# Patient Record
Sex: MALE | Race: WHITE | Employment: OTHER | URBAN - METROPOLITAN AREA
[De-identification: names, ages, dates, MRNs, and addresses within clinical notes are randomized per-mention and may not be internally consistent; named-entity substitution may affect disease eponyms.]

---

## 2019-02-06 PROBLEM — M25.512 ACUTE PAIN OF LEFT SHOULDER: Status: ACTIVE | Noted: 2019-02-06

## 2019-02-06 PROBLEM — M25.562 ACUTE PAIN OF BOTH KNEES: Status: ACTIVE | Noted: 2019-02-06

## 2019-02-06 PROBLEM — M25.561 ACUTE PAIN OF BOTH KNEES: Status: ACTIVE | Noted: 2019-02-06

## 2019-07-03 PROBLEM — E11.40 TYPE 2 DIABETES MELLITUS WITH DIABETIC NEUROPATHY (HCC): Status: ACTIVE | Noted: 2019-07-03

## 2019-09-18 RX ORDER — SODIUM CHLORIDE 0.9 % (FLUSH) 0.9 %
5-40 SYRINGE (ML) INJECTION EVERY 8 HOURS
Status: CANCELLED | OUTPATIENT
Start: 2019-09-18

## 2019-09-18 RX ORDER — SODIUM CHLORIDE 0.9 % (FLUSH) 0.9 %
5-40 SYRINGE (ML) INJECTION AS NEEDED
Status: CANCELLED | OUTPATIENT
Start: 2019-09-18

## 2019-09-22 ENCOUNTER — ANESTHESIA EVENT (OUTPATIENT)
Dept: SURGERY | Age: 64
End: 2019-09-22
Payer: COMMERCIAL

## 2019-09-23 ENCOUNTER — HOSPITAL ENCOUNTER (OUTPATIENT)
Age: 64
Setting detail: OUTPATIENT SURGERY
Discharge: HOME | End: 2019-09-23
Attending: ORTHOPAEDIC SURGERY | Admitting: ORTHOPAEDIC SURGERY
Payer: COMMERCIAL

## 2019-09-23 ENCOUNTER — ANESTHESIA (OUTPATIENT)
Dept: SURGERY | Age: 64
End: 2019-09-23
Payer: COMMERCIAL

## 2019-09-23 VITALS
TEMPERATURE: 98 F | WEIGHT: 190 LBS | OXYGEN SATURATION: 95 % | BODY MASS INDEX: 28.89 KG/M2 | RESPIRATION RATE: 15 BRPM | DIASTOLIC BLOOD PRESSURE: 84 MMHG | SYSTOLIC BLOOD PRESSURE: 131 MMHG | HEART RATE: 68 BPM

## 2019-09-23 LAB — GLUCOSE BLD STRIP.AUTO-MCNC: 212 MG/DL (ref 65–100)

## 2019-09-23 PROCEDURE — 77030010509 HC AIRWY LMA MSK TELE -A: Performed by: ANESTHESIOLOGY

## 2019-09-23 PROCEDURE — 77030006788 HC BLD SAW OSC STRY -B: Performed by: ORTHOPAEDIC SURGERY

## 2019-09-23 PROCEDURE — 77030027384 HC PRB ARTHSCP SERFAS STRY -C: Performed by: ORTHOPAEDIC SURGERY

## 2019-09-23 PROCEDURE — 77030040361 HC SLV COMPR DVT MDII -B: Performed by: ORTHOPAEDIC SURGERY

## 2019-09-23 PROCEDURE — A4565 SLINGS: HCPCS | Performed by: ORTHOPAEDIC SURGERY

## 2019-09-23 PROCEDURE — 76010000161 HC OR TIME 1 TO 1.5 HR INTENSV-TIER 1: Performed by: ORTHOPAEDIC SURGERY

## 2019-09-23 PROCEDURE — 74011000250 HC RX REV CODE- 250

## 2019-09-23 PROCEDURE — 76942 ECHO GUIDE FOR BIOPSY: CPT | Performed by: ORTHOPAEDIC SURGERY

## 2019-09-23 PROCEDURE — 76060000033 HC ANESTHESIA 1 TO 1.5 HR: Performed by: ORTHOPAEDIC SURGERY

## 2019-09-23 PROCEDURE — 77030018836 HC SOL IRR NACL ICUM -A: Performed by: ORTHOPAEDIC SURGERY

## 2019-09-23 PROCEDURE — C1713 ANCHOR/SCREW BN/BN,TIS/BN: HCPCS | Performed by: ORTHOPAEDIC SURGERY

## 2019-09-23 PROCEDURE — 82962 GLUCOSE BLOOD TEST: CPT

## 2019-09-23 PROCEDURE — 74011250636 HC RX REV CODE- 250/636: Performed by: ORTHOPAEDIC SURGERY

## 2019-09-23 PROCEDURE — 74011250636 HC RX REV CODE- 250/636: Performed by: ANESTHESIOLOGY

## 2019-09-23 PROCEDURE — 76210000020 HC REC RM PH II FIRST 0.5 HR: Performed by: ORTHOPAEDIC SURGERY

## 2019-09-23 PROCEDURE — 77030006891 HC BLD SHV RESECT STRY -B: Performed by: ORTHOPAEDIC SURGERY

## 2019-09-23 PROCEDURE — 77030004453 HC BUR SHV STRY -B: Performed by: ORTHOPAEDIC SURGERY

## 2019-09-23 PROCEDURE — 76010010054 HC POST OP PAIN BLOCK: Performed by: ORTHOPAEDIC SURGERY

## 2019-09-23 PROCEDURE — 74011250636 HC RX REV CODE- 250/636

## 2019-09-23 PROCEDURE — 77030003602 HC NDL NRV BLK BBMI -B: Performed by: ANESTHESIOLOGY

## 2019-09-23 PROCEDURE — 77030002933 HC SUT MCRYL J&J -A: Performed by: ORTHOPAEDIC SURGERY

## 2019-09-23 PROCEDURE — 76210000063 HC OR PH I REC FIRST 0.5 HR: Performed by: ORTHOPAEDIC SURGERY

## 2019-09-23 PROCEDURE — 77030019605: Performed by: ORTHOPAEDIC SURGERY

## 2019-09-23 PROCEDURE — 77030003666 HC NDL SPINAL BD -A: Performed by: ORTHOPAEDIC SURGERY

## 2019-09-23 RX ORDER — CEFAZOLIN SODIUM/WATER 2 G/20 ML
2 SYRINGE (ML) INTRAVENOUS ONCE
Status: COMPLETED | OUTPATIENT
Start: 2019-09-23 | End: 2019-09-23

## 2019-09-23 RX ORDER — SODIUM CHLORIDE 0.9 % (FLUSH) 0.9 %
5-40 SYRINGE (ML) INJECTION AS NEEDED
Status: DISCONTINUED | OUTPATIENT
Start: 2019-09-23 | End: 2019-09-23 | Stop reason: HOSPADM

## 2019-09-23 RX ORDER — EPINEPHRINE 1 MG/ML
INJECTION INTRAMUSCULAR; INTRAVENOUS; SUBCUTANEOUS AS NEEDED
Status: DISCONTINUED | OUTPATIENT
Start: 2019-09-23 | End: 2019-09-23 | Stop reason: HOSPADM

## 2019-09-23 RX ORDER — MIDAZOLAM HYDROCHLORIDE 1 MG/ML
2 INJECTION, SOLUTION INTRAMUSCULAR; INTRAVENOUS
Status: COMPLETED | OUTPATIENT
Start: 2019-09-23 | End: 2019-09-23

## 2019-09-23 RX ORDER — SODIUM CHLORIDE, SODIUM LACTATE, POTASSIUM CHLORIDE, CALCIUM CHLORIDE 600; 310; 30; 20 MG/100ML; MG/100ML; MG/100ML; MG/100ML
75 INJECTION, SOLUTION INTRAVENOUS CONTINUOUS
Status: DISCONTINUED | OUTPATIENT
Start: 2019-09-23 | End: 2019-09-23 | Stop reason: HOSPADM

## 2019-09-23 RX ORDER — BUPIVACAINE HYDROCHLORIDE 2.5 MG/ML
INJECTION, SOLUTION EPIDURAL; INFILTRATION; INTRACAUDAL
Status: DISCONTINUED | OUTPATIENT
Start: 2019-09-23 | End: 2019-09-23 | Stop reason: HOSPADM

## 2019-09-23 RX ORDER — ONDANSETRON 2 MG/ML
INJECTION INTRAMUSCULAR; INTRAVENOUS AS NEEDED
Status: DISCONTINUED | OUTPATIENT
Start: 2019-09-23 | End: 2019-09-23 | Stop reason: HOSPADM

## 2019-09-23 RX ORDER — PROPOFOL 10 MG/ML
INJECTION, EMULSION INTRAVENOUS AS NEEDED
Status: DISCONTINUED | OUTPATIENT
Start: 2019-09-23 | End: 2019-09-23 | Stop reason: HOSPADM

## 2019-09-23 RX ORDER — SODIUM CHLORIDE 0.9 % (FLUSH) 0.9 %
5-40 SYRINGE (ML) INJECTION EVERY 8 HOURS
Status: DISCONTINUED | OUTPATIENT
Start: 2019-09-23 | End: 2019-09-23 | Stop reason: HOSPADM

## 2019-09-23 RX ORDER — NALOXONE HYDROCHLORIDE 0.4 MG/ML
0.2 INJECTION, SOLUTION INTRAMUSCULAR; INTRAVENOUS; SUBCUTANEOUS AS NEEDED
Status: DISCONTINUED | OUTPATIENT
Start: 2019-09-23 | End: 2019-09-23 | Stop reason: HOSPADM

## 2019-09-23 RX ORDER — HYDROMORPHONE HYDROCHLORIDE 2 MG/ML
0.5 INJECTION, SOLUTION INTRAMUSCULAR; INTRAVENOUS; SUBCUTANEOUS
Status: DISCONTINUED | OUTPATIENT
Start: 2019-09-23 | End: 2019-09-23 | Stop reason: HOSPADM

## 2019-09-23 RX ORDER — DEXAMETHASONE SODIUM PHOSPHATE 4 MG/ML
INJECTION, SOLUTION INTRA-ARTICULAR; INTRALESIONAL; INTRAMUSCULAR; INTRAVENOUS; SOFT TISSUE AS NEEDED
Status: DISCONTINUED | OUTPATIENT
Start: 2019-09-23 | End: 2019-09-23 | Stop reason: HOSPADM

## 2019-09-23 RX ORDER — LIDOCAINE HYDROCHLORIDE 10 MG/ML
0.1 INJECTION INFILTRATION; PERINEURAL AS NEEDED
Status: DISCONTINUED | OUTPATIENT
Start: 2019-09-23 | End: 2019-09-23 | Stop reason: HOSPADM

## 2019-09-23 RX ORDER — LIDOCAINE HYDROCHLORIDE 20 MG/ML
INJECTION, SOLUTION EPIDURAL; INFILTRATION; INTRACAUDAL; PERINEURAL AS NEEDED
Status: DISCONTINUED | OUTPATIENT
Start: 2019-09-23 | End: 2019-09-23 | Stop reason: HOSPADM

## 2019-09-23 RX ORDER — BUPIVACAINE HYDROCHLORIDE AND EPINEPHRINE 5; 5 MG/ML; UG/ML
INJECTION, SOLUTION EPIDURAL; INTRACAUDAL; PERINEURAL
Status: DISCONTINUED | OUTPATIENT
Start: 2019-09-23 | End: 2019-09-23 | Stop reason: HOSPADM

## 2019-09-23 RX ORDER — OXYCODONE AND ACETAMINOPHEN 5; 325 MG/1; MG/1
1 TABLET ORAL AS NEEDED
Status: DISCONTINUED | OUTPATIENT
Start: 2019-09-23 | End: 2019-09-23 | Stop reason: HOSPADM

## 2019-09-23 RX ADMIN — SODIUM CHLORIDE, SODIUM LACTATE, POTASSIUM CHLORIDE, AND CALCIUM CHLORIDE: 600; 310; 30; 20 INJECTION, SOLUTION INTRAVENOUS at 07:38

## 2019-09-23 RX ADMIN — BUPIVACAINE HYDROCHLORIDE AND EPINEPHRINE 10 ML: 5; 5 INJECTION, SOLUTION EPIDURAL; INTRACAUDAL; PERINEURAL at 07:22

## 2019-09-23 RX ADMIN — MIDAZOLAM HYDROCHLORIDE 2 MG: 2 INJECTION, SOLUTION INTRAMUSCULAR; INTRAVENOUS at 07:16

## 2019-09-23 RX ADMIN — BUPIVACAINE HYDROCHLORIDE 10 ML: 2.5 INJECTION, SOLUTION EPIDURAL; INFILTRATION; INTRACAUDAL at 07:22

## 2019-09-23 RX ADMIN — PROPOFOL 200 MG: 10 INJECTION, EMULSION INTRAVENOUS at 07:45

## 2019-09-23 RX ADMIN — DEXAMETHASONE SODIUM PHOSPHATE 4 MG: 4 INJECTION, SOLUTION INTRA-ARTICULAR; INTRALESIONAL; INTRAMUSCULAR; INTRAVENOUS; SOFT TISSUE at 08:05

## 2019-09-23 RX ADMIN — Medication 2 G: at 08:00

## 2019-09-23 RX ADMIN — LIDOCAINE HYDROCHLORIDE 80 MG: 20 INJECTION, SOLUTION EPIDURAL; INFILTRATION; INTRACAUDAL; PERINEURAL at 07:45

## 2019-09-23 RX ADMIN — SODIUM CHLORIDE, SODIUM LACTATE, POTASSIUM CHLORIDE, AND CALCIUM CHLORIDE 75 ML/HR: 600; 310; 30; 20 INJECTION, SOLUTION INTRAVENOUS at 06:55

## 2019-09-23 RX ADMIN — ONDANSETRON 4 MG: 2 INJECTION INTRAMUSCULAR; INTRAVENOUS at 08:05

## 2019-09-23 NOTE — DISCHARGE INSTRUCTIONS
ACTIVITY  · As tolerated and as directed by your doctor. · Bathe or shower as directed by your doctor. DIET  · Clear liquids until no nausea or vomiting; then light diet for the first day. · Advance to regular diet on second day, unless your doctor orders otherwise. · If nausea and vomiting continues, call your doctor. PAIN  · Take pain medication as directed by your doctor. · Call your doctor if pain is NOT relieved by medication. · DO NOT take aspirin of blood thinners unless directed by your doctor. DRESSING CARE       CALL YOUR DOCTOR IF   · Excessive bleeding that does not stop after holding pressure over the area  · Temperature of 101 degrees F or above  · Excessive redness, swelling or bruising, and/ or green or yellow, smelly discharge from incision    AFTER ANESTHESIA   · For the first 24 hours: DO NOT Drive, Drink alcoholic beverages, or Make important decisions. · Be aware of dizziness following anesthesia and while taking pain medication. APPOINTMENT DATE/ TIME    YOUR DOCTOR'S PHONE NUMBER       DISCHARGE SUMMARY from Nurse    PATIENT INSTRUCTIONS:    After general anesthesia or intravenous sedation, for 24 hours or while taking prescription Narcotics:  · Limit your activities  · Do not drive and operate hazardous machinery  · Do not make important personal or business decisions  · Do  not drink alcoholic beverages  · If you have not urinated within 8 hours after discharge, please contact your surgeon on call. *  Please give a list of your current medications to your Primary Care Provider. *  Please update this list whenever your medications are discontinued, doses are      changed, or new medications (including over-the-counter products) are added. *  Please carry medication information at all times in case of emergency situations.       These are general instructions for a healthy lifestyle:    No smoking/ No tobacco products/ Avoid exposure to second hand smoke    Surgeon General's Warning:  Quitting smoking now greatly reduces serious risk to your health. Obesity, smoking, and sedentary lifestyle greatly increases your risk for illness    A healthy diet, regular physical exercise & weight monitoring are important for maintaining a healthy lifestyle    You may be retaining fluid if you have a history of heart failure or if you experience any of the following symptoms:  Weight gain of 3 pounds or more overnight or 5 pounds in a week, increased swelling in our hands or feet or shortness of breath while lying flat in bed. Please call your doctor as soon as you notice any of these symptoms; do not wait until your next office visit. Recognize signs and symptoms of STROKE:    F-face looks uneven    A-arms unable to move or move unevenly    S-speech slurred or non-existent    T-time-call 911 as soon as signs and symptoms begin-DO NOT go       Back to bed or wait to see if you get better-TIME IS BRAIN. Post-Operative Instructions   For  Shoulder Arthroscopy  Phone:  [phone]    1. Apply ice to the shoulder as needed. 2. You may shower after the arthroscopy. Keep dressings in place for the first three days and do not get them wet. After three days, you may remove the dressings and leave the steri-strip bandages in place; they will peel off naturally. 3. You may discontinue use of your sling and begin active range of motion of the elbow as tolerated by pain, unless you are instructed otherwise. Do not lift arm by your side for 4 weeks. 4. Begin therapy as ordered. 5. Use any pain medication as instructed. You should take your pain medication as soon as you feel the anesthetic wearing off. Do not wait until you are in severe pain to begin taking your pain medication. 6. You may have some side effects from your pain medication. If you have nausea, try taking your medication with food.   For itching, you may take over the counter Benadryl. 7. You may have been given a prescription for Phenergan. This medication is used for nausea and vomiting. You do not need to get this prescription filled unless you have a problem. 8. If you have a problem, please call [address] [place] [address] at [phone], P.A. Post-Operative Instructions   For  Shoulder Arthroscopy  Phone:  [phone]    1. Apply ice to the shoulder as needed. 2. You may shower after the arthroscopy. Keep dressings in place for the first three days and do not get them wet. After three days, you may remove the dressings and leave the steri-strip bandages in place; they will peel off naturally. 3. You may discontinue use of your sling and begin active range of motion of the elbow as tolerated by pain, unless you are instructed otherwise. Do not lift arm by your side for 4 weeks. 4. Begin therapy as ordered. 5. Use any pain medication as instructed. You should take your pain medication as soon as you feel the anesthetic wearing off. Do not wait until you are in severe pain to begin taking your pain medication. 6. You may have some side effects from your pain medication. If you have nausea, try taking your medication with food. For itching, you may take over the counter Benadryl. 7. You may have been given a prescription for Phenergan. This medication is used for nausea and vomiting. You do not need to get this prescription filled unless you have a problem. 8. If you have a problem, please call [address] [place] [address] at [phone], P.A. Post-Operative Instructions   For  Shoulder Arthroscopy  Phone:  [phone]    1. Apply ice to the shoulder as needed. 2. You may shower after the arthroscopy. Keep dressings in place for the first three days and do not get them wet.   After three days, you may remove the dressings and leave the steri-strip bandages in place; they will peel off naturally. 3. You may discontinue use of your sling and begin active range of motion of the elbow as tolerated by pain, unless you are instructed otherwise. Do not lift arm by your side for 4 weeks. 4. Begin therapy as ordered. 5. Use any pain medication as instructed. You should take your pain medication as soon as you feel the anesthetic wearing off. Do not wait until you are in severe pain to begin taking your pain medication. 6. You may have some side effects from your pain medication. If you have nausea, try taking your medication with food. For itching, you may take over the counter Benadryl. 7. You may have been given a prescription for Phenergan. This medication is used for nausea and vomiting. You do not need to get this prescription filled unless you have a problem. 8. If you have a problem, please call [address] [place] [address] at [phone], P.A.

## 2019-09-23 NOTE — OP NOTES
[address] [facility]  OPERATIVE REPORT    Name:  Hollie Garrett  MR#:  [id number]  :  1955  ACCOUNT #:  [de-identified]  DATE OF SERVICE:  2019    PREOPERATIVE DIAGNOSES:  Impingement of the left shoulder and acromioclavicular joint arthritis. POSTOPERATIVE DIAGNOSES:  1.  Mild chondromalacia of the glenohumeral joint. 2.  Acromioclavicular joint arthritis. 3.  Small bursal tear of the rotator cuff. 4.  Impingement of the left shoulder. PROCEDURE PERFORMED:  1. Arthroscopic Robert resection of the distal clavicle of the left shoulder. 43666  2. Chondroplasty of the glenohumeral joint. 79500  3. Debridement of small bursal tear of the rotator cuff. 4.  Subacromial decompression. 81231    SURGEON:  Talya Houston MD    ASSISTANT:  None. ANESTHESIA:  General.    COMPLICATIONS:  None. SPECIMENS REMOVED:  None. IMPLANTS:  None. ESTIMATED BLOOD LOSS:  Minimal.    PROCEDURE:  After an adequate level of general anesthesia was obtained, the left shoulder was prepped and draped in the usual sterile fashion. He had full range of motion of the shoulder. He had a block per Anesthesia for postop pain management and received antibiotics. The joint was distended posteriorly with saline and the arthroscope introduced. The patient was noted to have some mild chondromalacia in the joint. It was not significant. A chondroplasty was performed. There was no eburnated bone. This was performed through the anterior portal.  The biceps was intact. It was probed and very stable. Arthroscope was placed in the subacromial space. The patient had some hooking of the acromion and a decompression was performed with the shaver and the raquel. He had some osteophytes anteriorly and laterally. The rotator cuff was well visualized and the rotator cuff was intact. There was some bursal tearing which was debrided but overall it was stable and there was no need for repair.   The arthroscope was then placed in the subacromial space and a lateral portal was made. Circumferentially, soft tissue was removed around the end of the distal clavicle and then the inferior osteophytes removed from the lateral portal removing 1 cm inferiorly, and then through the anterior portal with pressure applied superiorly, the soft tissue was removed. A raquel was used to remove the superior bone removing 1 cm. The wound was then copiously irrigated. Sterile dressings were then applied. The arm was placed in a sling. He tolerated the procedure well.       MD ALISHA [id number]  D:  09/23/2019 8:56  T:  09/23/2019 9:28  JOB #:  [id number]  CC:  [id number] [facility]

## 2019-09-23 NOTE — H&P
Outpatient Surgery History and Physical:  Deandre Leos was seen and examined. CHIEF COMPLAINT:    l shoulder . PE:     Visit Vitals  /77 (BP 1 Location: Right arm, BP Patient Position: Supine)   Pulse 75   Temp 97.8 °F (36.6 °C)   Resp 18   Wt 86.2 kg (190 lb)   SpO2 95%   BMI 28.89 kg/m²       Heart:   Regular rhythm      Lungs:  Are clear      Past Medical History:    Patient Active Problem List    Diagnosis    Type 2 diabetes mellitus with diabetic neuropathy (HCC)    Type II diabetes mellitus with complication, uncontrolled (HCC)    Acute pain of left shoulder    Acute pain of both knees       Surgical History:   Past Surgical History:   Procedure Laterality Date    HX ORTHOPAEDIC      LT wrist    HX ORTHOPAEDIC      knee    HX ORTHOPAEDIC      RT lower herina       Social History: Patient  reports that he has quit smoking. He has never used smokeless tobacco. He reports that he does not drink alcohol or use drugs. Family History:   Family History   Problem Relation Age of Onset   Niecy Yates Arthritis-osteo Mother     Diabetes Mother     Diabetes Father     No Known Problems Sister     No Known Problems Brother        Allergies: Reviewed per EMR  No Known Allergies    Medications:    No current facility-administered medications on file prior to encounter. Current Outpatient Medications on File Prior to Encounter   Medication Sig    metFORMIN (GLUCOPHAGE) 500 mg tablet Take 1 Tab by mouth two (2) times daily (with meals).  atorvastatin (LIPITOR) 10 mg tablet Take 1 Tab by mouth daily. For diabetes treatment.  suvorexant (BELSOMRA) 20 mg tablet Take 1 Tab by mouth nightly as needed for Insomnia. Max Daily Amount: 20 mg.    gabapentin (NEURONTIN) 100 mg capsule Take 1 Cap by mouth three (3) times daily. (Patient taking differently: Take 100 mg by mouth three (3) times daily.  Per anesthesia protocol:instructed to take am of surgery.)    diclofenac (VOLTAREN) 1 % gel Apply  to affected area four (4) times daily.  miscellaneous medical supply misc Glucose test meter -use for up to three times daily glucose testing. ICD-10 E11.8    miscellaneous medical supply misc Lancets for up to three times daily glucose testing, icd-10 E11.8    miscellaneous medical supply misc Glucose Test Strips -Use for three times daily glucose testing - Icd-10 E 11.8    naproxen sodium (ALEVE) 220 mg tablet Take 220 mg by mouth two (2) times daily (with meals). Per anesthesia protocol: pt instructed to stop med 5 days prior to day of surgery. The surgery is planned for the . History and physical has been reviewed. The patient has been examined. There have been no significant clinical changes since the completion of the originally dated History and Physical.  Patient identified by surgeon; surgical site was confirmed by patient and surgeon. The patient is here today for outpatient surgery. I have examined the patient, no changes are noted in the patient's medical status. Necessity for the procedure/care is still present and the history and physical above is current. See the office notes for the full long term history of the problem. Please see the recent office notes for the musculoskeletal examination.     Signed By: Neida Mcghee MD     September 23, 2019 6:51 AM

## 2019-09-23 NOTE — ANESTHESIA PREPROCEDURE EVALUATION
Relevant Problems   No relevant active problems       Anesthetic History   No history of anesthetic complications            Review of Systems / Medical History  Patient summary reviewed, nursing notes reviewed and pertinent labs reviewed    Pulmonary  Within defined limits                 Neuro/Psych   Within defined limits           Cardiovascular                  Exercise tolerance: >4 METS     GI/Hepatic/Renal                Endo/Other    Diabetes: well controlled, type 2    Arthritis     Other Findings   Comments: type 2. normal fasting (180-220).  Jardiance Rx 9/18/19 in addition to metfromin           Physical Exam    Airway  Mallampati: II  TM Distance: < 4 cm  Neck ROM: normal range of motion   Mouth opening: Normal     Cardiovascular    Rhythm: regular           Dental  No notable dental hx       Pulmonary  Breath sounds clear to auscultation               Abdominal  Abdominal exam normal       Other Findings            Anesthetic Plan    ASA: 2  Anesthesia type: general      Post-op pain plan if not by surgeon: peripheral nerve block single      Anesthetic plan and risks discussed with: Patient

## 2019-09-23 NOTE — ANESTHESIA POSTPROCEDURE EVALUATION
Procedure(s):  LEFT SHOULDER ARTHROSCOPY  / DISTAL CLAVICLE RESECTION / CHONDROPLASTY.     general    Anesthesia Post Evaluation      Multimodal analgesia: multimodal analgesia used between 6 hours prior to anesthesia start to PACU discharge  Patient location during evaluation: PACU  Patient participation: complete - patient participated  Level of consciousness: awake and alert  Pain management: adequate  Airway patency: patent  Anesthetic complications: no  Cardiovascular status: acceptable  Respiratory status: acceptable  Hydration status: acceptable  Post anesthesia nausea and vomiting:  none      Vitals Value Taken Time   /86 9/23/2019  9:19 AM   Temp     Pulse 67 9/23/2019  9:19 AM   Resp 16 9/23/2019  9:19 AM   SpO2 98 % 9/23/2019  9:19 AM

## 2019-09-23 NOTE — BRIEF OP NOTE
BRIEF OPERATIVE NOTE    Date of Procedure: 9/23/2019   Preoperative Diagnosis: Degenerative joint disease of left acromioclavicular joint [M19.012]  Strain of muscle(s) and tendon(s) of the rotator cuff of left shoulder, initial encounter [S46.012A]  Degenerative tear of glenoid labrum of left shoulder [M24.112]  Postoperative Diagnosis: Degenerative joint disease of left acromioclavicular joint [M19.012]  CHONDROMALACIA    Procedure(s):  LEFT SHOULDER ARTHROSCOPY  / DISTAL CLAVICLE RESECTION / CHONDROPLASTY  Surgeon(s) and Role:      Alessandro Huang MD - Primary         Surgical Assistant:           Surgical Staff:  Circ-1: Kia Kim  Circ-2: Alejandro Brown RN  Scrub Tech-1: Anthony Bynum Time In Time Out   Incision Start 8147    Incision Close 9112      Anesthesia: General   Estimated Blood Loss:     Specimens: * No specimens in log *   Findings:      Complications:     Implants: * No implants in log *

## 2019-09-23 NOTE — ANESTHESIA PROCEDURE NOTES
Left Interscalene Nerve Block    Start time: 9/23/2019 7:16 AM  End time: 9/23/2019 7:22 AM  Performed by: Sonny Lares MD  Authorized by: Sonny Lares MD       Pre-procedure: Indications: at surgeon's request and post-op pain management    Preanesthetic Checklist: patient identified, risks and benefits discussed, site marked, timeout performed, anesthesia consent given and patient being monitored    Timeout Time: 07:16          Block Type:   Block Type: Interscalene  Laterality:  Left  Monitoring:  Standard ASA monitoring, responsive to questions, oxygen, continuous pulse ox, frequent vital sign checks and heart rate  Injection Technique:  Single shot  Procedures: ultrasound guided    Patient Position: seated  Prep: chlorhexidine    Location:  Interscalene  Needle Type:  Stimuplex  Needle Gauge:  22 G  Needle Localization:  Ultrasound guidance    Assessment:  Number of attempts:  1  Injection Assessment:  Incremental injection every 5 mL, negative aspiration for CSF, no paresthesia, ultrasound image on chart, no intravascular symptoms, negative aspiration for blood and local visualized surrounding nerve on ultrasound  Patient tolerance:  Patient tolerated the procedure well with no immediate complications  The relevant block area was scanned before, during, and after the local anesthetic injection and no gross abnormalities were observed.

## 2021-07-14 PROBLEM — N52.9 ERECTILE DYSFUNCTION: Status: ACTIVE | Noted: 2021-07-14

## 2021-07-14 PROBLEM — Z12.11 ENCOUNTER FOR SCREENING COLONOSCOPY: Status: ACTIVE | Noted: 2021-07-14

## 2021-08-13 PROBLEM — Z12.11 ENCOUNTER FOR SCREENING COLONOSCOPY: Status: RESOLVED | Noted: 2021-07-14 | Resolved: 2021-08-13

## 2021-10-28 PROBLEM — Z12.11 ENCOUNTER FOR SCREENING COLONOSCOPY: Status: RESOLVED | Noted: 2021-07-14 | Resolved: 2021-10-28

## 2022-01-17 ENCOUNTER — HOSPITAL ENCOUNTER (OUTPATIENT)
Dept: LAB | Age: 67
Discharge: HOME | End: 2022-01-17
Attending: SURGERY

## 2022-01-17 ENCOUNTER — HOSPITAL ENCOUNTER (EMERGENCY)
Age: 67
Discharge: STILL ADMITTED OR ADMINISTRATIVE | End: 2022-01-17
Attending: EMERGENCY MEDICINE

## 2022-01-19 ENCOUNTER — ANESTHESIA (OUTPATIENT)
Dept: ENDOSCOPY | Age: 67
End: 2022-01-19
Payer: MEDICARE

## 2022-01-19 ENCOUNTER — ANESTHESIA EVENT (OUTPATIENT)
Dept: ENDOSCOPY | Age: 67
End: 2022-01-19
Payer: MEDICARE

## 2022-01-19 ENCOUNTER — HOSPITAL ENCOUNTER (OUTPATIENT)
Age: 67
Setting detail: OUTPATIENT SURGERY
Discharge: HOME | End: 2022-01-19
Attending: SURGERY | Admitting: SURGERY
Payer: MEDICARE

## 2022-01-19 VITALS
BODY MASS INDEX: 25.92 KG/M2 | RESPIRATION RATE: 14 BRPM | DIASTOLIC BLOOD PRESSURE: 77 MMHG | OXYGEN SATURATION: 98 % | WEIGHT: 175 LBS | HEART RATE: 59 BPM | SYSTOLIC BLOOD PRESSURE: 130 MMHG | TEMPERATURE: 97.7 F | HEIGHT: 69 IN

## 2022-01-19 DIAGNOSIS — Z12.11 ENCOUNTER FOR SCREENING COLONOSCOPY: ICD-10-CM

## 2022-01-19 LAB
GLUCOSE BLD STRIP.AUTO-MCNC: 240 MG/DL (ref 65–100)
SERVICE CMNT-IMP: ABNORMAL

## 2022-01-19 PROCEDURE — 74011250636 HC RX REV CODE- 250/636: Performed by: REGISTERED NURSE

## 2022-01-19 PROCEDURE — 74011250636 HC RX REV CODE- 250/636: Performed by: ANESTHESIOLOGY

## 2022-01-19 PROCEDURE — G0121 COLON CA SCRN NOT HI RSK IND: HCPCS | Performed by: SURGERY

## 2022-01-19 PROCEDURE — 76040000025: Performed by: SURGERY

## 2022-01-19 PROCEDURE — 74011000250 HC RX REV CODE- 250: Performed by: REGISTERED NURSE

## 2022-01-19 PROCEDURE — 76060000031 HC ANESTHESIA FIRST 0.5 HR: Performed by: SURGERY

## 2022-01-19 PROCEDURE — 82962 GLUCOSE BLOOD TEST: CPT

## 2022-01-19 PROCEDURE — 2709999900 HC NON-CHARGEABLE SUPPLY: Performed by: SURGERY

## 2022-01-19 RX ORDER — PROPOFOL 10 MG/ML
INJECTION, EMULSION INTRAVENOUS AS NEEDED
Status: DISCONTINUED | OUTPATIENT
Start: 2022-01-19 | End: 2022-01-19 | Stop reason: HOSPADM

## 2022-01-19 RX ORDER — SODIUM CHLORIDE, SODIUM LACTATE, POTASSIUM CHLORIDE, CALCIUM CHLORIDE 600; 310; 30; 20 MG/100ML; MG/100ML; MG/100ML; MG/100ML
1000 INJECTION, SOLUTION INTRAVENOUS CONTINUOUS
Status: DISCONTINUED | OUTPATIENT
Start: 2022-01-19 | End: 2022-01-19 | Stop reason: HOSPADM

## 2022-01-19 RX ORDER — LIDOCAINE HYDROCHLORIDE 20 MG/ML
INJECTION, SOLUTION EPIDURAL; INFILTRATION; INTRACAUDAL; PERINEURAL AS NEEDED
Status: DISCONTINUED | OUTPATIENT
Start: 2022-01-19 | End: 2022-01-19 | Stop reason: HOSPADM

## 2022-01-19 RX ORDER — PROPOFOL 10 MG/ML
INJECTION, EMULSION INTRAVENOUS
Status: DISCONTINUED | OUTPATIENT
Start: 2022-01-19 | End: 2022-01-19 | Stop reason: HOSPADM

## 2022-01-19 RX ADMIN — PROPOFOL 140 MCG/KG/MIN: 10 INJECTION, EMULSION INTRAVENOUS at 10:52

## 2022-01-19 RX ADMIN — PROPOFOL 50 MG: 10 INJECTION, EMULSION INTRAVENOUS at 10:52

## 2022-01-19 RX ADMIN — SODIUM CHLORIDE, SODIUM LACTATE, POTASSIUM CHLORIDE, AND CALCIUM CHLORIDE: 600; 310; 30; 20 INJECTION, SOLUTION INTRAVENOUS at 10:14

## 2022-01-19 RX ADMIN — PROPOFOL 20 MG: 10 INJECTION, EMULSION INTRAVENOUS at 10:53

## 2022-01-19 RX ADMIN — LIDOCAINE HYDROCHLORIDE 50 MG: 20 INJECTION, SOLUTION EPIDURAL; INFILTRATION; INTRACAUDAL; PERINEURAL at 10:52

## 2022-01-19 NOTE — OP NOTES
[address] [facility]  OPERATIVE REPORT    Name:  Marcos Busch  MR#:  [id number]  :  1955  ACCOUNT #:  [de-identified]  DATE OF SERVICE:  2022    PREOPERATIVE DIAGNOSIS:  Screening colonoscopy. POSTOPERATIVE DIAGNOSIS:  Diverticula of the cecum and ascending colon. PROCEDURE PERFORMED:  Colonoscopy. SURGEON:  Linh Walden MD    ASSISTANT:  none    ANESTHESIA:  MAC.    COMPLICATIONS:  None. SPECIMENS REMOVED:  None. IMPLANTS:  None. ESTIMATED BLOOD LOSS:  None. COUNT:  Correct. PROCEDURE:  After the patient was brought into the room, time-out was carried out and all were in agreement. He was rolled onto his left side and MAC anesthesia introduced. Once he was ready, the scope gently inserted into the rectum and under direct visualization passed to the level of the cecum. The patient had a poor prep. Slow withdrawal was done with findings of cecal diverticula and ascending colon diverticula. Transverse colon, splenic flexure, descending colon and sigmoid normal.  Rectum normal.  Anus normal.  Scope removed. As much air that could be evacuated was done prior to removal of the scope. The patient tolerated the procedure well.       MD JACK [email or web address]  D:  2022 11:09  T:  2022 12:36  JOB #:  [id number]

## 2022-01-19 NOTE — ANESTHESIA POSTPROCEDURE EVALUATION
Procedure(s):  COLONOSCOPY/ BMI 27. total IV anesthesia    Anesthesia Post Evaluation        Patient location during evaluation: PACU  Patient participation: complete - patient participated  Level of consciousness: awake and alert  Pain management: adequate  Airway patency: patent  Anesthetic complications: no  Cardiovascular status: acceptable  Respiratory status: acceptable  Hydration status: acceptable  Post anesthesia nausea and vomiting:  none  Final Post Anesthesia Temperature Assessment:  Normothermia (36.0-37.5 degrees C)      INITIAL Post-op Vital signs:   Vitals Value Taken Time   /77 01/19/22 1139   Temp     Pulse 59 01/19/22 1139   Resp     SpO2 98 % 01/19/22 1139   Vitals shown include unvalidated device data.

## 2022-01-19 NOTE — ANESTHESIA PREPROCEDURE EVALUATION
Relevant Problems   ENDOCRINE   (+) Type 2 diabetes mellitus with diabetic neuropathy (HCC)   (+) Type II diabetes mellitus with complication, uncontrolled (HCC)       Anesthetic History   No history of anesthetic complications            Review of Systems / Medical History  Patient summary reviewed, nursing notes reviewed and pertinent labs reviewed    Pulmonary  Within defined limits                 Neuro/Psych   Within defined limits           Cardiovascular              Hyperlipidemia    Exercise tolerance: >4 METS     GI/Hepatic/Renal                Endo/Other    Diabetes: well controlled, type 2    Arthritis     Other Findings   Comments: type 2. normal fasting (180-220)           Physical Exam    Airway  Mallampati: II  TM Distance: < 4 cm  Neck ROM: normal range of motion   Mouth opening: Normal     Cardiovascular    Rhythm: regular           Dental  No notable dental hx       Pulmonary  Breath sounds clear to auscultation               Abdominal  Abdominal exam normal       Other Findings            Anesthetic Plan    ASA: 3  Anesthesia type: total IV anesthesia            Anesthetic plan and risks discussed with: Patient

## 2022-01-19 NOTE — BRIEF OP NOTE
Brief Postoperative Note    Patient: Olga Sidhu  YOB: 1955  MRN: [id number]    Date of Procedure: 1/19/2022     Pre-Op Diagnosis: Colon cancer screening [Z12.11]    Post-Op Diagnosis: Cecal and Ascending colon diverticulae      Procedure(s):  COLONOSCOPY/ BMI 27    Surgeon(s):  Gill Munoz MD    Surgical Assistant: None    Anesthesia: MAC     Estimated Blood Loss (mL): Minimal    Complications: None    Specimens: * No specimens in log *     Implants: * No implants in log *    Drains: * No LDAs found *    Findings: Poor prep    Electronically Signed by Joseph Landaverde MD on 1/19/2022 at 11:09 AM

## 2022-01-19 NOTE — H&P
RANDY Mccormick is a 72 y.o. male Patient presents today for screening colonoscopy. Patient denies melena, denies hematochezia, denies abdominal or rectal pain. Patient states bowel habits are good, denies diarrhea or constipation. Good appetite, no unintentional weight loss noted. Denies N/V. Denies CP or SOB. He has never had one.     Does not  have a family history of colon cancer. Does not take blood thinners                Past Medical History:   Diagnosis Date    Arrhythmia       pt denies history    Degenerative joint disease of left acromioclavicular joint      Diabetes ([address]       type 2. normal fasting (180-220). Jardiance Rx 9/18/19 in addition to metfromin by PCP    Strain of muscle(s) and tendon(s) of the rotator cuff of left shoulder, initial encounter               Current Outpatient Medications   Medication Sig Dispense Refill    atorvastatin (LIPITOR) 10 mg tablet Take 1 Tablet by mouth daily. 90 Tablet 3    sildenafil citrate (Viagra) 100 mg tablet Take 1 Tablet by mouth as needed for Erectile Dysfunction. 30 Tablet 5    glimepiride (AMARYL) 4 mg tablet Take 1 Tablet by mouth every morning. 90 Tablet 1    losartan (COZAAR) 25 mg tablet Take 1 Tablet by mouth daily. Stop lisinopril , ACE I cough 90 Tablet 1    gabapentin (NEURONTIN) 100 mg capsule Take 1 Capsule by mouth three (3) times daily. 270 Capsule 1    miscellaneous medical supply misc Diabetic shoes, one pair, peripheral neuropathy. ICD 10 E11.9 1 Each 0    metFORMIN (GLUCOPHAGE) 500 mg tablet Three tablets in am and two tablets in pm po  CHANGE OF DOSE 450 Tablet 1    miscellaneous medical supply misc Glucose Test Strips -Use for three times daily glucose testing - Icd-10 E 11.8 300 Each 3    miscellaneous medical supply [facility] Lancets for up to three times daily glucose testing, icd-10 E11.8 300 Each 3    miscellaneous medical supply misc Glucose test meter -use for up to three times daily glucose testing.   ICD-10 E11.8 1 Each 0    naproxen sodium (ALEVE) 220 mg tablet Take 220 mg by mouth two (2) times daily (with meals). Per anesthesia protocol: pt instructed to stop med 5 days prior to day of surgery.        diclofenac (VOLTAREN) 1 % gel Apply 4 g to affected area four (4) times daily. 300 g 3      No Known Allergies        Past Surgical History:   Procedure Laterality Date    HX ORTHOPAEDIC         LT wrist    HX ORTHOPAEDIC         knee    HX ORTHOPAEDIC         RT lower herina            Family History   Problem Relation Age of Onset    Arthritis-osteo Mother      Diabetes Mother      Diabetes Father      No Known Problems Sister      No Known Problems Brother        Social History           Tobacco Use    Smoking status: Former Smoker    Smokeless tobacco: Never Used   Substance Use Topics    Alcohol use: No         Review of Systems   A comprehensive review of systems was negative except for that written in the HPI. Physical Exam  Visit Vitals  BP (!) 140/80   Pulse 78   Ht 5' 8.5\" (1.74 m)   Wt 180 lb 11.2 oz (82 kg)   SpO2 96%   BMI 27.08 kg/m²         Constitutional: Alert, oriented, cooperative patient in no acute distress; appears stated age    Eyes:Sclera are clear. EOMs intact  ENMT: no external lesions gross hearing normal; no obvious neck masses, no ear or lip lesions, nares normal  CV: RRR. Normal perfusion  Resp: No JVD. Breathing is  non-labored; no audible wheezing. GI: soft and non-distended     Musculoskeletal: unremarkable with normal function. No embolic signs or cyanosis. Neuro:  Oriented; moves all 4; no focal deficits  Psychiatric: normal affect and mood, no memory impairment        Labs:      Assessment/Plan:   Mateusz Mariscal is a 72 y.o. male who has signs and symptoms consistent with need for screening colonoscopy. Patient verbalized understanding of importance for bowel prep.      1. Schedule screening colonoscopy. He would like to go forward.   Proceed with screening colonoscopy. The patient was counseled at length about the risks of edmnud Covid-19 during their perioperative period and any recovery window from their procedure.  The patient was made aware that edmund Covid-19  may worsen their prognosis for recovering from their procedure and lend to a higher morbidity and/or mortality risk.  All material risks, benefits, and reasonable alternatives including postponing the procedure were discussed. The patient does  wish to proceed with the procedure at this time.              I discussed the patient's condition and treatment options with the patient. I discussed risks of colonoscopy in language the patient could understand including bleeding, infection, aspiration, perforation, medication reaction, need for further endoscopy or surgery, abscess, fistula, SBO, DVT, PE, heart attack, stroke, renal failure, respiratory failure, ventilatory dependence, and death. The patient voiced understanding of all this and all questions were answered. Alternatives to colonoscopy were discussed also and risks of the alternatives. The patient requested that we proceed with colonoscopy. Informed consent was obtained. A copy of this note is sent to the referring physician.       Electronically signed by Rakesh Burns MD

## 2022-03-19 PROBLEM — M25.561 ACUTE PAIN OF BOTH KNEES: Status: ACTIVE | Noted: 2019-02-06

## 2022-03-19 PROBLEM — E11.40 TYPE 2 DIABETES MELLITUS WITH DIABETIC NEUROPATHY (HCC): Status: ACTIVE | Noted: 2019-07-03

## 2022-03-19 PROBLEM — N52.9 ERECTILE DYSFUNCTION: Status: ACTIVE | Noted: 2021-07-14

## 2022-03-19 PROBLEM — M25.512 ACUTE PAIN OF LEFT SHOULDER: Status: ACTIVE | Noted: 2019-02-06

## 2022-03-19 PROBLEM — M25.562 ACUTE PAIN OF BOTH KNEES: Status: ACTIVE | Noted: 2019-02-06

## 2022-10-17 ENCOUNTER — OFFICE VISIT (OUTPATIENT)
Dept: PRIMARY CARE CLINIC | Facility: CLINIC | Age: 67
End: 2022-10-17
Payer: MEDICARE

## 2022-10-17 VITALS
SYSTOLIC BLOOD PRESSURE: 141 MMHG | BODY MASS INDEX: 26.37 KG/M2 | TEMPERATURE: 97 F | DIASTOLIC BLOOD PRESSURE: 80 MMHG | WEIGHT: 176 LBS | HEART RATE: 81 BPM

## 2022-10-17 DIAGNOSIS — N40.0 BPH WITHOUT URINARY OBSTRUCTION: ICD-10-CM

## 2022-10-17 DIAGNOSIS — N48.1 BALANITIS: ICD-10-CM

## 2022-10-17 DIAGNOSIS — E55.9 VITAMIN D DEFICIENCY: ICD-10-CM

## 2022-10-17 DIAGNOSIS — Z23 FLU VACCINE NEED: ICD-10-CM

## 2022-10-17 DIAGNOSIS — I10 PRIMARY HYPERTENSION: ICD-10-CM

## 2022-10-17 DIAGNOSIS — Z00.00 MEDICARE ANNUAL WELLNESS VISIT, SUBSEQUENT: Primary | ICD-10-CM

## 2022-10-17 DIAGNOSIS — E78.2 MIXED HYPERLIPIDEMIA: ICD-10-CM

## 2022-10-17 DIAGNOSIS — B37.49 YEAST DERMATITIS OF PENIS: ICD-10-CM

## 2022-10-17 DIAGNOSIS — E11.40 TYPE 2 DIABETES MELLITUS WITH DIABETIC NEUROPATHY, WITHOUT LONG-TERM CURRENT USE OF INSULIN (HCC): ICD-10-CM

## 2022-10-17 PROCEDURE — 1123F ACP DISCUSS/DSCN MKR DOCD: CPT | Performed by: FAMILY MEDICINE

## 2022-10-17 PROCEDURE — 90694 VACC AIIV4 NO PRSRV 0.5ML IM: CPT | Performed by: FAMILY MEDICINE

## 2022-10-17 PROCEDURE — G0008 ADMIN INFLUENZA VIRUS VAC: HCPCS | Performed by: FAMILY MEDICINE

## 2022-10-17 PROCEDURE — G0439 PPPS, SUBSEQ VISIT: HCPCS | Performed by: FAMILY MEDICINE

## 2022-10-17 PROCEDURE — 99214 OFFICE O/P EST MOD 30 MIN: CPT | Performed by: FAMILY MEDICINE

## 2022-10-17 RX ORDER — FLUCONAZOLE 100 MG/1
100 TABLET ORAL DAILY
Qty: 7 TABLET | Refills: 1 | Status: SHIPPED | OUTPATIENT
Start: 2022-10-17 | End: 2022-10-24

## 2022-10-17 RX ORDER — GLIMEPIRIDE 4 MG/1
4 TABLET ORAL
Qty: 90 TABLET | Refills: 1 | Status: SHIPPED | OUTPATIENT
Start: 2022-10-17

## 2022-10-17 RX ORDER — SILDENAFIL 100 MG/1
100 TABLET, FILM COATED ORAL PRN
Qty: 30 TABLET | Status: CANCELLED | OUTPATIENT
Start: 2022-10-17

## 2022-10-17 RX ORDER — ATORVASTATIN CALCIUM 10 MG/1
10 TABLET, FILM COATED ORAL DAILY
Qty: 3090 TABLET | Refills: 1 | Status: SHIPPED | OUTPATIENT
Start: 2022-10-17

## 2022-10-17 RX ORDER — LOSARTAN POTASSIUM 25 MG/1
TABLET ORAL
Qty: 30 TABLET | Status: CANCELLED | OUTPATIENT
Start: 2022-10-17

## 2022-10-17 RX ORDER — NYSTATIN 100000 U/G
CREAM TOPICAL
Qty: 30 G | Refills: 1 | Status: SHIPPED | OUTPATIENT
Start: 2022-10-17

## 2022-10-17 RX ORDER — GABAPENTIN 100 MG/1
100 CAPSULE ORAL 3 TIMES DAILY
Qty: 90 CAPSULE | Status: CANCELLED | OUTPATIENT
Start: 2022-10-17

## 2022-10-17 RX ORDER — LOSARTAN POTASSIUM 50 MG/1
50 TABLET ORAL DAILY
Qty: 90 TABLET | Refills: 1 | Status: SHIPPED | OUTPATIENT
Start: 2022-10-17

## 2022-10-17 RX ORDER — GABAPENTIN 300 MG/1
300 CAPSULE ORAL 3 TIMES DAILY
Qty: 270 CAPSULE | Refills: 1 | Status: SHIPPED | OUTPATIENT
Start: 2022-10-17 | End: 2022-11-16

## 2022-10-17 ASSESSMENT — PATIENT HEALTH QUESTIONNAIRE - PHQ9
SUM OF ALL RESPONSES TO PHQ9 QUESTIONS 1 & 2: 0
SUM OF ALL RESPONSES TO PHQ QUESTIONS 1-9: 0
2. FEELING DOWN, DEPRESSED OR HOPELESS: 0
SUM OF ALL RESPONSES TO PHQ QUESTIONS 1-9: 0
1. LITTLE INTEREST OR PLEASURE IN DOING THINGS: 0

## 2022-10-17 ASSESSMENT — VISUAL ACUITY
OD_CC: 20/30
OS_CC: 20/30

## 2022-10-17 ASSESSMENT — LIFESTYLE VARIABLES
HOW MANY STANDARD DRINKS CONTAINING ALCOHOL DO YOU HAVE ON A TYPICAL DAY: PATIENT DOES NOT DRINK
HOW OFTEN DO YOU HAVE A DRINK CONTAINING ALCOHOL: NEVER

## 2022-10-17 NOTE — PROGRESS NOTES
Vanessa Bautista MD  [address] [place]  Ph No:  [phone]  Fax:  [phone]:  Chief Complaint   Patient presents with    Penis Pain     Pt is in because he has a yeast infection under the skin of the head of his penis. Abnormal Lab     Pt wants his A1c today. Also he need all other labs. Microalbumin    Medicare AWV          HISTORY OF PRESENT ILLNESS:  Mr. Elba Escalona is a 77 y.o. male. Pt presents for diabetic recheck and general follow up visit. Pt says he feels okay, as to glucose; it is not perfectly controlled, but averages between 170 and 200 or greater. Pt is currently only taking oral med and advised we need to obtain labs today to be certain about his [id number], as this will help us to know how to treat him more effectively. Pt is advised we will consider Trulicity and/or possibly a long acting insulin for better control. If Trulicity is used, we may consider stopping the amaryl as a medication. Pt will let us know when his new insurance is in place and we will try for additional medication after first of year. /80, recheck next visit to see if cozaar (ARB) needs increased of dose. Hyperlipidemia - pt is on lipitor, statin. Diabetic foot exam:      Right foot, loss of sensation, great toe and 2,3,4, plantar pad but lower is okay. Left foot, loss of sensation plantar fat pad, toes okay, base of great toe, pre-ulcerative callous. Pt does qualify for diabetic shoes, will need to ask him if he wants a pair of shoes. Pt reports his primary concern today is balanitis. Pt reports his foreskin will not retract for about a month and it is becoming a discomfort to him. Pt is advised we will start him on diflucan, but since he cannot retract the foreskin we will refer him to the urologist for further evaluation and treatment. Pt is referred.     Following lab review, recommendations will be made as to better diabetes control and/or other issues of concern as may be indicated. HISTORY:  No Known Allergies  Past Medical History:   Diagnosis Date    Arrhythmia     pt denies history    Degenerative joint disease of left acromioclavicular joint     Diabetes ([facility] [address]     type 2. normal fasting (180-220).  Jardiance Rx 9/18/19 in addition to metfromin by PCP    Strain of muscle(s) and tendon(s) of the rotator cuff of left shoulder, initial encounter      Past Surgical History:   Procedure Laterality Date    COLONOSCOPY N/A 1/19/2022    COLONOSCOPY/ BMI 27 performed by Antonio Cummings MD at [facility] ENDOSCOPY    ORTHOPEDIC SURGERY      LT wrist    ORTHOPEDIC SURGERY      RT lower herina    ORTHOPEDIC SURGERY      knee     Family History   Problem Relation Age of Onset    Osteoarthritis Mother     No Known Problems Brother     No Known Problems Sister     Diabetes Father     Diabetes Mother      Social History     Socioeconomic History    Marital status:      Spouse name: Not on file    Number of children: Not on file    Years of education: Not on file    Highest education level: Not on file   Occupational History    Not on file   Tobacco Use    Smoking status: Former    Smokeless tobacco: Never   Substance and Sexual Activity    Alcohol use: No    Drug use: No    Sexual activity: Not on file   Other Topics Concern    Not on file   Social History Narrative    Not on file     Social Determinants of Health     Financial Resource Strain: Not on file   Food Insecurity: Not on file   Transportation Needs: Not on file   Physical Activity: Sufficiently Active    Days of Exercise per Week: 7 days    Minutes of Exercise per Session: 40 min   Stress: Not on file   Social Connections: Not on file   Intimate Partner Violence: Not on file   Housing Stability: Not on file     Current Outpatient Medications   Medication Sig Dispense Refill    metFORMIN (GLUCOPHAGE) 500 MG tablet Three tablets in am and two tablets in pm po CHANGE OF DOSE 450 tablet 1    glimepiride (AMARYL) 4 MG tablet Take 1 tablet by mouth every morning (before breakfast) TAKE 1 TABLET BY MOUTH EVERY DAY IN THE MORNING 90 tablet 1    atorvastatin (LIPITOR) 10 MG tablet Take 1 tablet by mouth daily 3090 tablet 1    fluconazole (DIFLUCAN) 100 MG tablet Take 1 tablet by mouth daily for 7 days 7 tablet 1    nystatin (MYCOSTATIN) 004161 UNIT/GM cream Apply topically 2 times daily. 30 g 1    gabapentin (NEURONTIN) 300 MG capsule Take 1 capsule by mouth 3 times daily for 30 days. 270 capsule 1    losartan (COZAAR) 50 MG tablet Take 1 tablet by mouth daily 90 tablet 1    diclofenac sodium (VOLTAREN) 1 % GEL Apply 4 g topically 4 times daily      gabapentin (NEURONTIN) 100 MG capsule Take 100 mg by mouth 3 times daily. losartan (COZAAR) 25 MG tablet TAKE 1 TABLET BY MOUTH DAILY      naproxen sodium (ANAPROX) 220 MG tablet Take 220 mg by mouth 2 times daily (with meals)      sildenafil (VIAGRA) 100 MG tablet Take 100 mg by mouth as needed       No current facility-administered medications for this visit. REVIEW OF SYSTEMS:  Review of systems is as indicated in HPI otherwise negative. PHYSICAL EXAM:  Vital Signs - BP (!) 141/80 (Site: Right Upper Arm)   Pulse 81   Temp 97 °F (36.1 °C) (Temporal)   Wt 176 lb (79.8 kg)   BMI 26.37 kg/m²      Physical Exam  Vitals and nursing note reviewed. Constitutional:       General: He is in acute distress. Appearance: Normal appearance. He is normal weight. Comments: See HPI, not well controlled diabetes, neuropathy, presents today with ballanitis   HENT:      Head: Normocephalic and atraumatic. Nose: Nose normal.      Mouth/Throat:      Mouth: Mucous membranes are moist.      Pharynx: Oropharynx is clear. Eyes:      Extraocular Movements: Extraocular movements intact. Conjunctiva/sclera: Conjunctivae normal.      Pupils: Pupils are equal, round, and reactive to light.    Cardiovascular:      Rate and Rhythm: Normal rate and regular rhythm. Pulses: Normal pulses. Heart sounds: Normal heart sounds. Pulmonary:      Effort: Pulmonary effort is normal.      Comments: Coarse breath sounds, but otherwise mostly clear to ascultation bilaterally  Abdominal:      General: Abdomen is flat. Bowel sounds are normal.      Palpations: Abdomen is soft. Genitourinary:     Comments: Balanitis - unable to retract foreskin X 1 month  Musculoskeletal:         General: Normal range of motion. Cervical back: Normal range of motion and neck supple. Skin:     General: Skin is warm and dry. Capillary Refill: Capillary refill takes 2 to 3 seconds. Comments: But see above   Neurological:      General: No focal deficit present. Mental Status: He is alert and oriented to person, place, and time. Sensory: Sensory deficit present. Comments: See diabetic foot exam   Psychiatric:         Behavior: Behavior normal.         Thought Content: Thought content normal.         Judgment: Judgment normal.      Comments: General health anxiety           LABS  No results found for this visit on 10/17/22. IMPRESSION/PLAN     Diagnosis Orders   1. Medicare annual wellness visit, subsequent        2. Flu vaccine need  Influenza, FLUAD, (age 72 y+), IM, Preservative Free, 0.5 mL      3. Yeast dermatitis of penis  fluconazole (DIFLUCAN) 100 MG tablet    nystatin (MYCOSTATIN) 704561 UNIT/GM cream    [address] [place]      4.  Type 2 diabetes mellitus with diabetic neuropathy, without long-term current use of insulin (HCC)  metFORMIN (GLUCOPHAGE) 500 MG tablet    glimepiride (AMARYL) 4 MG tablet    atorvastatin (LIPITOR) 10 MG tablet     DIABETES FOOT EXAM    gabapentin (NEURONTIN) 300 MG capsule    losartan (COZAAR) 50 MG tablet    CBC with Auto Differential    Comprehensive Metabolic Panel    Hemoglobin A1C    Vitamin D 25 Hydroxy    TSH    T4, Free    Lipid Panel    Microalbumin / Creatinine Urine Ratio      5. BPH without urinary obstruction  PSA, Diagnostic      6. Vitamin D deficiency  Vitamin D 25 Hydroxy      7. Balanitis        8. Primary hypertension  losartan (COZAAR) 50 MG tablet    Comprehensive Metabolic Panel      9. Mixed hyperlipidemia  atorvastatin (LIPITOR) 10 MG tablet          No follow-up provider specified. Rosanne Young MD            Dictated using voice recognition software. Proofread, but unrecognized voice recognition errors may exist.      Medicare Annual Wellness Visit    Maryan Scales is here for Penis Pain (Pt is in because he has a yeast infection under the skin of the head of his penis.), Abnormal Lab (Pt wants his A1c today. Also he need all other labs. Microalbumin), and Medicare AWV    Assessment & Plan   Medicare annual wellness visit, subsequent  Flu vaccine need  -     Influenza, FLUAD, (age 72 y+), IM, Preservative Free, 0.5 mL  Yeast dermatitis of penis  -     fluconazole (DIFLUCAN) 100 MG tablet; Take 1 tablet by mouth daily for 7 days, Disp-7 tablet, R-1Normal  -     nystatin (MYCOSTATIN) 829734 UNIT/GM cream; Apply topically 2 times daily. , Disp-30 g, R-1, Normal  -     [address] [place]  Type 2 diabetes mellitus with diabetic neuropathy, without long-term current use of insulin (HCC)  -     metFORMIN (GLUCOPHAGE) 500 MG tablet; Three tablets in am and two tablets in pm po CHANGE OF DOSE, Disp-450 tablet, R-1Normal  -     glimepiride (AMARYL) 4 MG tablet; Take 1 tablet by mouth every morning (before breakfast) TAKE 1 TABLET BY MOUTH EVERY DAY IN THE MORNING, Disp-90 tablet, R-1Normal  -     atorvastatin (LIPITOR) 10 MG tablet; Take 1 tablet by mouth daily, Disp-3090 tablet, R-1Normal  -     [facility] DIABETES FOOT EXAM  -     gabapentin (NEURONTIN) 300 MG capsule; Take 1 capsule by mouth 3 times daily for 30 days. , Disp-270 capsule, R-1Normal  -     losartan (COZAAR) 50 MG tablet;  Take 1 tablet by mouth daily, Disp-90 tablet, R-1Normal  -     CBC with Auto Differential; Future  -     Comprehensive Metabolic Panel; Future  -     Hemoglobin A1C; Future  -     Vitamin D 25 Hydroxy; Future  -     TSH; Future  -     T4, Free; Future  -     Lipid Panel; Future  -     Microalbumin / Creatinine Urine Ratio; Future  BPH without urinary obstruction  -     PSA, Diagnostic; Future  Vitamin D deficiency  -     Vitamin D 25 Hydroxy; Future  Balanitis  Primary hypertension  -     losartan (COZAAR) 50 MG tablet; Take 1 tablet by mouth daily, Disp-90 tablet, R-1Normal  -     Comprehensive Metabolic Panel; Future  Mixed hyperlipidemia  -     atorvastatin (LIPITOR) 10 MG tablet; Take 1 tablet by mouth daily, Disp-3090 tablet, R-1Normal    Recommendations for Preventive Services Due: see orders and patient instructions/AVS.  Recommended screening schedule for the next 5-10 years is provided to the patient in written form: see Patient Instructions/AVS.     Return in 3 months (on 1/17/2023), or hgba1c, cmp, for Medicare Annual Wellness Visit in 1 year, for labs one week before appt. Subjective   The following acute and/or chronic problems were also addressed today:  See HPI above, multiple medical concerns. Patient's complete Health Risk Assessment and screening values have been reviewed and are found in Flowsheets. The following problems were reviewed today and where indicated follow up appointments were made and/or referrals ordered.     Positive Risk Factor Screenings with Interventions:             General Health and ACP:  General  In general, how would you say your health is?: Very Good  In the past 7 days, have you experienced any of the following: New or Increased Pain, New or Increased Fatigue, Loneliness, Social Isolation, Stress or Anger?: No  Do you get the social and emotional support that you need?: Yes  Do you have a Living Will?: (!) No    Advance Directives       Power of  Living Will ACP-Advance Directive ACP-Power of [employer] Not on File Not on File Not on File Not on File          General Health Risk Interventions:  Diabetic foot exam and review of potential medication changes    Health Habits/Nutrition:  Physical Activity: Sufficiently Active    Days of Exercise per Week: 7 days    Minutes of Exercise per Session: 40 min     Have you lost any weight without trying in the past 3 months?: No     Have you seen the dentist within the past year?: (!) No  Health Habits/Nutrition Interventions:  None, but pt to continue diabetic diet    Hearing/Vision:  Do you or your family notice any trouble with your hearing that hasn't been managed with hearing aids?: No  Do you have difficulty driving, watching TV, or doing any of your daily activities because of your eyesight?: No  Have you had an eye exam within the past year?: (!) No  Vision Screening    Right eye Left eye Both eyes   Without correction      With correction 20/30 20/30 20/30     Hearing/Vision Interventions:  Diabetic eye exam, to be done annually    Safety:  Do you have working smoke detectors?: Yes  Do you have any tripping hazards - loose or unsecured carpets or rugs?: No  Do you have any tripping hazards - clutter in doorways, halls, or stairs?: No  Do you have either shower bars, grab bars, non-slip mats or non-slip surfaces in your shower or bathtub?: Yes  Do all of your stairways have a railing or banister?: (!) No  Do you always fasten your seatbelt when you are in a car?: Yes  Safety Interventions:  Patient declines any further evaluation/treatment for this issue           Objective   Vitals:    10/17/22 1124 10/17/22 1126   BP: (!) 144/80 (!) 141/80   Site: Left Upper Arm Right Upper Arm   Position: Sitting    Cuff Size: Large Adult    Pulse: 81    Temp: 97 °F (36.1 °C)    TempSrc: Temporal    Weight: 176 lb (79.8 kg)       Body mass index is 26.37 kg/m². No Known Allergies  Prior to Visit Medications    Medication Sig Taking?  Authorizing Provider   metFORMIN (GLUCOPHAGE) 500 MG tablet Three tablets in am and two tablets in pm po CHANGE OF DOSE Yes Valentine Roland MD   glimepiride (AMARYL) 4 MG tablet Take 1 tablet by mouth every morning (before breakfast) TAKE 1 TABLET BY MOUTH EVERY DAY IN THE MORNING Yes Valentine Roland MD   atorvastatin (LIPITOR) 10 MG tablet Take 1 tablet by mouth daily Yes Valentine Roland MD   fluconazole (DIFLUCAN) 100 MG tablet Take 1 tablet by mouth daily for 7 days Yes Valentine Roland MD   nystatin (MYCOSTATIN) 840341 UNIT/GM cream Apply topically 2 times daily. Yes Valentine Roland MD   gabapentin (NEURONTIN) 300 MG capsule Take 1 capsule by mouth 3 times daily for 30 days. Yes Valentine Beckman., MD   losartan (COZAAR) 50 MG tablet Take 1 tablet by mouth daily Yes Valentine Roland MD   diclofenac sodium (VOLTAREN) 1 % GEL Apply 4 g topically 4 times daily Yes Ar Automatic Reconciliation   gabapentin (NEURONTIN) 100 MG capsule Take 100 mg by mouth 3 times daily.  Yes Ar Automatic Reconciliation   losartan (COZAAR) 25 MG tablet TAKE 1 TABLET BY MOUTH DAILY Yes Ar Automatic Reconciliation   naproxen sodium (ANAPROX) 220 MG tablet Take 220 mg by mouth 2 times daily (with meals) Yes Ar Automatic Reconciliation   sildenafil (VIAGRA) 100 MG tablet Take 100 mg by mouth as needed Yes Ar Automatic Reconciliation       CareTeam (Including outside providers/suppliers regularly involved in providing care):   Patient Care Team:  Valentine Roland MD as PCP - General  Valentine Roland MD as PCP - [facility] Empaneled Provider     Reviewed and updated this visit:  Allergies  Meds  Problems

## 2022-10-17 NOTE — PATIENT INSTRUCTIONS
Personalized Preventive Plan for Adelnia Mzea - 10/17/2022  Medicare offers a range of preventive health benefits. Some of the tests and screenings are paid in full while other may be subject to a deductible, co-insurance, and/or copay. Some of these benefits include a comprehensive review of your medical history including lifestyle, illnesses that may run in your family, and various assessments and screenings as appropriate. After reviewing your medical record and screening and assessments performed today your provider may have ordered immunizations, labs, imaging, and/or referrals for you. A list of these orders (if applicable) as well as your Preventive Care list are included within your After Visit Summary for your review. Other Preventive Recommendations:    A preventive eye exam performed by an eye specialist is recommended every 1-2 years to screen for glaucoma; cataracts, macular degeneration, and other eye disorders. A preventive dental visit is recommended every 6 months. Try to get at least 150 minutes of exercise per week or 10,000 steps per day on a pedometer . Order or download the FREE \"Exercise & Physical Activity: Your Everyday Guide\" from The "[employer] Data on Aging. Call 6-995.753.4123 or search The "[employer] Data on Aging online. You need 0191-4194 mg of calcium and 7460-3582 IU of vitamin D per day. It is possible to meet your calcium requirement with diet alone, but a vitamin D supplement is usually necessary to meet this goal.  When exposed to the sun, use a sunscreen that protects against both UVA and UVB radiation with an SPF of 30 or greater. Reapply every 2 to 3 hours or after sweating, drying off with a towel, or swimming. Always wear a seat belt when traveling in a car. Always wear a helmet when riding a bicycle or motorcycle.

## 2022-10-18 DIAGNOSIS — E55.9 VITAMIN D DEFICIENCY: Primary | ICD-10-CM

## 2022-10-18 DIAGNOSIS — E11.40 TYPE 2 DIABETES MELLITUS WITH DIABETIC NEUROPATHY, WITHOUT LONG-TERM CURRENT USE OF INSULIN (HCC): ICD-10-CM

## 2022-10-18 LAB
25(OH)D3 SERPL-MCNC: 9.6 NG/ML (ref 30–100)
ALBUMIN SERPL-MCNC: 4.5 G/DL (ref 3.2–4.6)
ALBUMIN/GLOB SERPL: 1.7 {RATIO} (ref 0.4–1.6)
ALP SERPL-CCNC: 72 U/L (ref 50–136)
ALT SERPL-CCNC: 30 U/L (ref 12–65)
ANION GAP SERPL CALC-SCNC: 7 MMOL/L (ref 2–11)
AST SERPL-CCNC: 9 U/L (ref 15–37)
BASOPHILS # BLD: 0 K/UL (ref 0–0.2)
BASOPHILS NFR BLD: 0 % (ref 0–2)
BILIRUB SERPL-MCNC: 0.7 MG/DL (ref 0.2–1.1)
BUN SERPL-MCNC: 10 MG/DL (ref 8–23)
CALCIUM SERPL-MCNC: 9.6 MG/DL (ref 8.3–10.4)
CHLORIDE SERPL-SCNC: 102 MMOL/L (ref 101–110)
CHOLEST SERPL-MCNC: 151 MG/DL
CO2 SERPL-SCNC: 29 MMOL/L (ref 21–32)
CREAT SERPL-MCNC: 0.9 MG/DL (ref 0.8–1.5)
CREAT UR-MCNC: 14 MG/DL
DIFFERENTIAL METHOD BLD: ABNORMAL
EOSINOPHIL # BLD: 0 K/UL (ref 0–0.8)
EOSINOPHIL NFR BLD: 0 % (ref 0.5–7.8)
ERYTHROCYTE [DISTWIDTH] IN BLOOD BY AUTOMATED COUNT: 14.4 % (ref 11.9–14.6)
EST. AVERAGE GLUCOSE BLD GHB EST-MCNC: 255 MG/DL
GLOBULIN SER CALC-MCNC: 2.6 G/DL (ref 2.8–4.5)
GLUCOSE SERPL-MCNC: 297 MG/DL (ref 65–100)
HBA1C MFR BLD: 10.5 % (ref 4.8–5.6)
HCT VFR BLD AUTO: 46.6 % (ref 41.1–50.3)
HDLC SERPL-MCNC: 57 MG/DL (ref 40–60)
HDLC SERPL: 2.6 {RATIO}
HGB BLD-MCNC: 14.9 G/DL (ref 13.6–17.2)
IMM GRANULOCYTES # BLD AUTO: 0 K/UL (ref 0–0.5)
IMM GRANULOCYTES NFR BLD AUTO: 0 % (ref 0–5)
LDLC SERPL CALC-MCNC: 74.8 MG/DL
LYMPHOCYTES # BLD: 1.5 K/UL (ref 0.5–4.6)
LYMPHOCYTES NFR BLD: 22 % (ref 13–44)
MCH RBC QN AUTO: 28 PG (ref 26.1–32.9)
MCHC RBC AUTO-ENTMCNC: 32 G/DL (ref 31.4–35)
MCV RBC AUTO: 87.4 FL (ref 82–102)
MICROALBUMIN UR-MCNC: 1.01 MG/DL
MICROALBUMIN/CREAT UR-RTO: 72 MG/G
MONOCYTES # BLD: 0.4 K/UL (ref 0.1–1.3)
MONOCYTES NFR BLD: 6 % (ref 4–12)
NEUTS SEG # BLD: 4.8 K/UL (ref 1.7–8.2)
NEUTS SEG NFR BLD: 72 % (ref 43–78)
NRBC # BLD: 0 K/UL (ref 0–0.2)
PLATELET # BLD AUTO: 180 K/UL (ref 150–450)
PMV BLD AUTO: 11.4 FL (ref 9.4–12.3)
POTASSIUM SERPL-SCNC: 4.3 MMOL/L (ref 3.5–5.1)
PROT SERPL-MCNC: 7.1 G/DL (ref 6.3–8.2)
PSA SERPL-MCNC: 2.4 NG/ML
RBC # BLD AUTO: 5.33 M/UL (ref 4.23–5.6)
SODIUM SERPL-SCNC: 138 MMOL/L (ref 133–143)
T4 FREE SERPL-MCNC: 1 NG/DL (ref 0.78–1.46)
TRIGL SERPL-MCNC: 96 MG/DL (ref 35–150)
TSH, 3RD GENERATION: 1.17 UIU/ML (ref 0.36–3.74)
VLDLC SERPL CALC-MCNC: 19.2 MG/DL (ref 6–23)
WBC # BLD AUTO: 6.8 K/UL (ref 4.3–11.1)

## 2022-10-18 RX ORDER — ERGOCALCIFEROL 1.25 MG/1
50000 CAPSULE ORAL WEEKLY
Qty: 12 CAPSULE | Refills: 1 | Status: SHIPPED | OUTPATIENT
Start: 2022-10-18

## 2023-01-10 DIAGNOSIS — R79.9 ABNORMAL BLOOD CHEMISTRY: ICD-10-CM

## 2023-01-10 DIAGNOSIS — Z13.228 SCREENING FOR PHENYLKETONURIA (PKU): ICD-10-CM

## 2023-01-10 DIAGNOSIS — E55.9 VITAMIN D DEFICIENCY DISEASE: ICD-10-CM

## 2023-01-10 DIAGNOSIS — Z13.1 SCREENING FOR DIABETES MELLITUS: Primary | ICD-10-CM

## 2023-01-10 DIAGNOSIS — R53.82 CHRONIC FATIGUE: ICD-10-CM

## 2023-01-10 DIAGNOSIS — Z13.6 SCREENING FOR ISCHEMIC HEART DISEASE: ICD-10-CM

## 2023-01-16 ENCOUNTER — TELEPHONE (OUTPATIENT)
Dept: PRIMARY CARE CLINIC | Facility: CLINIC | Age: 68
End: 2023-01-16

## 2023-01-16 NOTE — TELEPHONE ENCOUNTER
Called Dave to reschedule appt - he did not answer so I left a message for him to call back      ----- Message from Jennifer Do sent at 1/12/2023  9:33 AM EST -----  Subject: Appointment Request    Reason for Call: Established Patient Appointment needed: Routine Existing   Condition Follow Up (Diabetes)    QUESTIONS    Reason for appointment request? Available appointments did not meet   patient need     Additional Information for Provider? patient is scheduled 17th needs to   reschedule for a Wednesday, looking to come into the office (only virtual   is coming up) after that is booked he is looking to reschedule for his   labs as well   ---------------------------------------------------------------------------  --------------  CALL BACK INFO  [phone]; OK to leave message on voicemail  ---------------------------------------------------------------------------  --------------  SCRIPT ANSWERS  COVID Screen: Green

## 2023-02-02 DIAGNOSIS — E55.9 VITAMIN D DEFICIENCY DISEASE: ICD-10-CM

## 2023-02-02 DIAGNOSIS — Z13.6 SCREENING FOR ISCHEMIC HEART DISEASE: ICD-10-CM

## 2023-02-02 DIAGNOSIS — Z13.228 SCREENING FOR PHENYLKETONURIA (PKU): ICD-10-CM

## 2023-02-02 DIAGNOSIS — Z13.1 SCREENING FOR DIABETES MELLITUS: ICD-10-CM

## 2023-02-02 DIAGNOSIS — R53.82 CHRONIC FATIGUE: ICD-10-CM

## 2023-02-02 DIAGNOSIS — R79.9 ABNORMAL BLOOD CHEMISTRY: ICD-10-CM

## 2023-02-02 LAB
25(OH)D3 SERPL-MCNC: 36.5 NG/ML (ref 30–100)
ALBUMIN SERPL-MCNC: 4.3 G/DL (ref 3.2–4.6)
ALBUMIN/GLOB SERPL: 1.6 (ref 0.4–1.6)
ALP SERPL-CCNC: 58 U/L (ref 50–136)
ALT SERPL-CCNC: 31 U/L (ref 12–65)
ANION GAP SERPL CALC-SCNC: 5 MMOL/L (ref 2–11)
AST SERPL-CCNC: 15 U/L (ref 15–37)
BASOPHILS # BLD: 0.1 K/UL (ref 0–0.2)
BASOPHILS NFR BLD: 1 % (ref 0–2)
BILIRUB SERPL-MCNC: 0.4 MG/DL (ref 0.2–1.1)
BUN SERPL-MCNC: 17 MG/DL (ref 8–23)
CALCIUM SERPL-MCNC: 9.5 MG/DL (ref 8.3–10.4)
CHLORIDE SERPL-SCNC: 107 MMOL/L (ref 101–110)
CHOLEST SERPL-MCNC: 107 MG/DL
CO2 SERPL-SCNC: 28 MMOL/L (ref 21–32)
CREAT SERPL-MCNC: 0.8 MG/DL (ref 0.8–1.5)
DIFFERENTIAL METHOD BLD: NORMAL
EOSINOPHIL # BLD: 0.1 K/UL (ref 0–0.8)
EOSINOPHIL NFR BLD: 2 % (ref 0.5–7.8)
ERYTHROCYTE [DISTWIDTH] IN BLOOD BY AUTOMATED COUNT: 13.5 % (ref 11.9–14.6)
EST. AVERAGE GLUCOSE BLD GHB EST-MCNC: 146 MG/DL
GLOBULIN SER CALC-MCNC: 2.7 G/DL (ref 2.8–4.5)
GLUCOSE SERPL-MCNC: 138 MG/DL (ref 65–100)
HBA1C MFR BLD: 6.7 % (ref 4.8–5.6)
HCT VFR BLD AUTO: 44.5 % (ref 41.1–50.3)
HDLC SERPL-MCNC: 50 MG/DL (ref 40–60)
HDLC SERPL: 2.1
HGB BLD-MCNC: 14.7 G/DL (ref 13.6–17.2)
IMM GRANULOCYTES # BLD AUTO: 0 K/UL (ref 0–0.5)
IMM GRANULOCYTES NFR BLD AUTO: 0 % (ref 0–5)
LDLC SERPL CALC-MCNC: 43 MG/DL
LYMPHOCYTES # BLD: 1.9 K/UL (ref 0.5–4.6)
LYMPHOCYTES NFR BLD: 30 % (ref 13–44)
MCH RBC QN AUTO: 28 PG (ref 26.1–32.9)
MCHC RBC AUTO-ENTMCNC: 33 G/DL (ref 31.4–35)
MCV RBC AUTO: 84.8 FL (ref 82–102)
MONOCYTES # BLD: 0.4 K/UL (ref 0.1–1.3)
MONOCYTES NFR BLD: 7 % (ref 4–12)
NEUTS SEG # BLD: 3.7 K/UL (ref 1.7–8.2)
NEUTS SEG NFR BLD: 60 % (ref 43–78)
NRBC # BLD: 0 K/UL (ref 0–0.2)
PLATELET # BLD AUTO: 185 K/UL (ref 150–450)
PMV BLD AUTO: 10.7 FL (ref 9.4–12.3)
POTASSIUM SERPL-SCNC: 4.8 MMOL/L (ref 3.5–5.1)
PROT SERPL-MCNC: 7 G/DL (ref 6.3–8.2)
RBC # BLD AUTO: 5.25 M/UL (ref 4.23–5.6)
SODIUM SERPL-SCNC: 140 MMOL/L (ref 133–143)
T4 FREE SERPL-MCNC: 0.8 NG/DL (ref 0.78–1.46)
TRIGL SERPL-MCNC: 70 MG/DL (ref 35–150)
TSH, 3RD GENERATION: 1.62 UIU/ML (ref 0.36–3.74)
VLDLC SERPL CALC-MCNC: 14 MG/DL (ref 6–23)
WBC # BLD AUTO: 6.2 K/UL (ref 4.3–11.1)

## 2023-02-09 ENCOUNTER — OFFICE VISIT (OUTPATIENT)
Dept: PRIMARY CARE CLINIC | Facility: CLINIC | Age: 68
End: 2023-02-09
Payer: MEDICARE

## 2023-02-09 VITALS
BODY MASS INDEX: 26.81 KG/M2 | TEMPERATURE: 97.9 F | HEART RATE: 82 BPM | HEIGHT: 69 IN | SYSTOLIC BLOOD PRESSURE: 143 MMHG | WEIGHT: 181 LBS | DIASTOLIC BLOOD PRESSURE: 82 MMHG

## 2023-02-09 DIAGNOSIS — N40.0 BPH WITHOUT URINARY OBSTRUCTION: ICD-10-CM

## 2023-02-09 DIAGNOSIS — E11.40 TYPE 2 DIABETES MELLITUS WITH DIABETIC NEUROPATHY, WITHOUT LONG-TERM CURRENT USE OF INSULIN (HCC): Primary | ICD-10-CM

## 2023-02-09 DIAGNOSIS — E78.2 MIXED HYPERLIPIDEMIA: ICD-10-CM

## 2023-02-09 DIAGNOSIS — I10 PRIMARY HYPERTENSION: ICD-10-CM

## 2023-02-09 DIAGNOSIS — E55.9 VITAMIN D DEFICIENCY: ICD-10-CM

## 2023-02-09 PROCEDURE — 99214 OFFICE O/P EST MOD 30 MIN: CPT | Performed by: FAMILY MEDICINE

## 2023-02-09 PROCEDURE — 3044F HG A1C LEVEL LT 7.0%: CPT | Performed by: FAMILY MEDICINE

## 2023-02-09 PROCEDURE — 3074F SYST BP LT 130 MM HG: CPT | Performed by: FAMILY MEDICINE

## 2023-02-09 PROCEDURE — G0009 ADMIN PNEUMOCOCCAL VACCINE: HCPCS | Performed by: FAMILY MEDICINE

## 2023-02-09 PROCEDURE — 90677 PCV20 VACCINE IM: CPT | Performed by: FAMILY MEDICINE

## 2023-02-09 PROCEDURE — 3078F DIAST BP <80 MM HG: CPT | Performed by: FAMILY MEDICINE

## 2023-02-09 PROCEDURE — 1123F ACP DISCUSS/DSCN MKR DOCD: CPT | Performed by: FAMILY MEDICINE

## 2023-02-09 RX ORDER — GABAPENTIN 300 MG/1
300 CAPSULE ORAL 3 TIMES DAILY
Qty: 270 CAPSULE | Refills: 1 | Status: SHIPPED | OUTPATIENT
Start: 2023-02-09 | End: 2023-03-11

## 2023-02-09 RX ORDER — LOSARTAN POTASSIUM 50 MG/1
50 TABLET ORAL DAILY
Qty: 90 TABLET | Refills: 1 | Status: SHIPPED | OUTPATIENT
Start: 2023-02-09

## 2023-02-09 RX ORDER — ATORVASTATIN CALCIUM 10 MG/1
10 TABLET, FILM COATED ORAL DAILY
Qty: 90 TABLET | Refills: 1 | Status: SHIPPED | OUTPATIENT
Start: 2023-02-09

## 2023-02-09 RX ORDER — GLIMEPIRIDE 4 MG/1
4 TABLET ORAL
Qty: 90 TABLET | Refills: 1 | Status: SHIPPED | OUTPATIENT
Start: 2023-02-09

## 2023-02-09 RX ORDER — ERGOCALCIFEROL 1.25 MG/1
50000 CAPSULE ORAL WEEKLY
Qty: 12 CAPSULE | Refills: 1 | Status: SHIPPED | OUTPATIENT
Start: 2023-02-09

## 2023-02-09 ASSESSMENT — PATIENT HEALTH QUESTIONNAIRE - PHQ9
1. LITTLE INTEREST OR PLEASURE IN DOING THINGS: 0
SUM OF ALL RESPONSES TO PHQ QUESTIONS 1-9: 0
2. FEELING DOWN, DEPRESSED OR HOPELESS: 0
SUM OF ALL RESPONSES TO PHQ9 QUESTIONS 1 & 2: 0
SUM OF ALL RESPONSES TO PHQ QUESTIONS 1-9: 0

## 2023-02-10 PROBLEM — I10 PRIMARY HYPERTENSION: Status: ACTIVE | Noted: 2023-02-10

## 2023-02-10 PROBLEM — E78.2 MIXED HYPERLIPIDEMIA: Status: ACTIVE | Noted: 2023-02-10

## 2023-03-27 ENCOUNTER — APPOINTMENT (OUTPATIENT)
Dept: CT IMAGING | Age: 68
End: 2023-03-27
Payer: MEDICARE

## 2023-03-27 ENCOUNTER — HOSPITAL ENCOUNTER (EMERGENCY)
Age: 68
Discharge: HOME | End: 2023-03-27
Attending: EMERGENCY MEDICINE | Admitting: EMERGENCY MEDICINE
Payer: MEDICARE

## 2023-03-27 VITALS
RESPIRATION RATE: 18 BRPM | HEART RATE: 76 BPM | DIASTOLIC BLOOD PRESSURE: 72 MMHG | TEMPERATURE: 97.8 F | OXYGEN SATURATION: 97 % | WEIGHT: 200 LBS | BODY MASS INDEX: 29.62 KG/M2 | HEIGHT: 69 IN | SYSTOLIC BLOOD PRESSURE: 139 MMHG

## 2023-03-27 DIAGNOSIS — R40.4 ALTERED AWARENESS, TRANSIENT: Primary | ICD-10-CM

## 2023-03-27 DIAGNOSIS — E86.9 VOLUME DEPLETION: ICD-10-CM

## 2023-03-27 LAB
ALBUMIN SERPL-MCNC: 3.3 G/DL (ref 3.2–4.6)
ALBUMIN/GLOB SERPL: 1.5 (ref 0.4–1.6)
ALP SERPL-CCNC: 38 U/L (ref 50–136)
ALT SERPL-CCNC: 29 U/L (ref 12–65)
AMPHET UR QL SCN: POSITIVE
ANION GAP SERPL CALC-SCNC: 3 MMOL/L (ref 2–11)
APPEARANCE UR: CLEAR
AST SERPL-CCNC: 22 U/L (ref 15–37)
BACTERIA URNS QL MICRO: 0 /HPF
BASOPHILS # BLD: 0 K/UL (ref 0–0.2)
BASOPHILS NFR BLD: 1 % (ref 0–2)
BENZODIAZ UR QL: NEGATIVE
BILIRUB SERPL-MCNC: 0.3 MG/DL (ref 0.2–1.1)
BILIRUB UR QL: NEGATIVE
BUN SERPL-MCNC: 16 MG/DL (ref 8–23)
CALCIUM SERPL-MCNC: 8.9 MG/DL (ref 8.3–10.4)
CANNABINOIDS UR QL SCN: NEGATIVE
CHLORIDE SERPL-SCNC: 109 MMOL/L (ref 101–110)
CO2 SERPL-SCNC: 25 MMOL/L (ref 21–32)
COCAINE UR QL SCN: NEGATIVE
COLOR UR: ABNORMAL
CREAT SERPL-MCNC: 1 MG/DL (ref 0.8–1.5)
DIFFERENTIAL METHOD BLD: ABNORMAL
EKG ATRIAL RATE: 75 BPM
EKG DIAGNOSIS: NORMAL
EKG P AXIS: 70 DEGREES
EKG P-R INTERVAL: 165 MS
EKG Q-T INTERVAL: 411 MS
EKG QRS DURATION: 89 MS
EKG QTC CALCULATION (BAZETT): 453 MS
EKG R AXIS: 40 DEGREES
EKG T AXIS: 40 DEGREES
EKG VENTRICULAR RATE: 73 BPM
EOSINOPHIL # BLD: 0 K/UL (ref 0–0.8)
EOSINOPHIL NFR BLD: 0 % (ref 0.5–7.8)
EPI CELLS #/AREA URNS HPF: ABNORMAL /HPF
ERYTHROCYTE [DISTWIDTH] IN BLOOD BY AUTOMATED COUNT: 13.9 % (ref 11.9–14.6)
ETHANOL SERPL-MCNC: <3 MG/DL (ref 0–0.08)
GLOBULIN SER CALC-MCNC: 2.2 G/DL (ref 2.8–4.5)
GLUCOSE SERPL-MCNC: 311 MG/DL (ref 65–100)
GLUCOSE UR STRIP.AUTO-MCNC: >1000 MG/DL
HCT VFR BLD AUTO: 39.5 % (ref 41.1–50.3)
HGB BLD-MCNC: 13.4 G/DL (ref 13.6–17.2)
HGB UR QL STRIP: NEGATIVE
IMM GRANULOCYTES # BLD AUTO: 0 K/UL (ref 0–0.5)
IMM GRANULOCYTES NFR BLD AUTO: 1 % (ref 0–5)
KETONES UR QL STRIP.AUTO: ABNORMAL MG/DL
LEUKOCYTE ESTERASE UR QL STRIP.AUTO: NEGATIVE
LIPASE SERPL-CCNC: 87 U/L (ref 73–393)
LYMPHOCYTES # BLD: 1.3 K/UL (ref 0.5–4.6)
LYMPHOCYTES NFR BLD: 16 % (ref 13–44)
MCH RBC QN AUTO: 28.3 PG (ref 26.1–32.9)
MCHC RBC AUTO-ENTMCNC: 33.9 G/DL (ref 31.4–35)
MCV RBC AUTO: 83.3 FL (ref 82–102)
MONOCYTES # BLD: 0.4 K/UL (ref 0.1–1.3)
MONOCYTES NFR BLD: 5 % (ref 4–12)
NEUTS SEG # BLD: 6.4 K/UL (ref 1.7–8.2)
NEUTS SEG NFR BLD: 77 % (ref 43–78)
NITRITE UR QL STRIP.AUTO: NEGATIVE
NRBC # BLD: 0 K/UL (ref 0–0.2)
OPIATES UR QL: NEGATIVE
OTHER OBSERVATIONS: ABNORMAL
PH UR STRIP: 5.5 (ref 5–9)
PLATELET # BLD AUTO: 161 K/UL (ref 150–450)
PMV BLD AUTO: 10.7 FL (ref 9.4–12.3)
POTASSIUM SERPL-SCNC: 4.3 MMOL/L (ref 3.5–5.1)
PROCALCITONIN SERPL-MCNC: <0.05 NG/ML (ref 0–0.49)
PROT SERPL-MCNC: 5.5 G/DL (ref 6.3–8.2)
PROT UR STRIP-MCNC: ABNORMAL MG/DL
RBC # BLD AUTO: 4.74 M/UL (ref 4.23–5.6)
SODIUM SERPL-SCNC: 137 MMOL/L (ref 133–143)
SP GR UR REFRACTOMETRY: 1.02 (ref 1–1.02)
TROPONIN I SERPL HS-MCNC: <3 PG/ML (ref 0–57)
UROBILINOGEN UR QL STRIP.AUTO: 1 EU/DL (ref 0.2–1)
WBC # BLD AUTO: 8.1 K/UL (ref 4.3–11.1)
WBC URNS QL MICRO: ABNORMAL /HPF

## 2023-03-27 PROCEDURE — 80307 DRUG TEST PRSMV CHEM ANLYZR: CPT

## 2023-03-27 PROCEDURE — 83690 ASSAY OF LIPASE: CPT

## 2023-03-27 PROCEDURE — 70450 CT HEAD/BRAIN W/O DYE: CPT

## 2023-03-27 PROCEDURE — 85025 COMPLETE CBC W/AUTO DIFF WBC: CPT

## 2023-03-27 PROCEDURE — 2580000003 HC RX 258: Performed by: EMERGENCY MEDICINE

## 2023-03-27 PROCEDURE — 80053 COMPREHEN METABOLIC PANEL: CPT

## 2023-03-27 PROCEDURE — 99284 EMERGENCY DEPT VISIT MOD MDM: CPT

## 2023-03-27 PROCEDURE — 82077 ASSAY SPEC XCP UR&BREATH IA: CPT

## 2023-03-27 PROCEDURE — 93005 ELECTROCARDIOGRAM TRACING: CPT | Performed by: EMERGENCY MEDICINE

## 2023-03-27 PROCEDURE — 84484 ASSAY OF TROPONIN QUANT: CPT

## 2023-03-27 PROCEDURE — 81001 URINALYSIS AUTO W/SCOPE: CPT

## 2023-03-27 PROCEDURE — 84145 PROCALCITONIN (PCT): CPT

## 2023-03-27 RX ORDER — 0.9 % SODIUM CHLORIDE 0.9 %
1000 INTRAVENOUS SOLUTION INTRAVENOUS
Status: COMPLETED | OUTPATIENT
Start: 2023-03-27 | End: 2023-03-27

## 2023-03-27 RX ORDER — 0.9 % SODIUM CHLORIDE 0.9 %
1000 INTRAVENOUS SOLUTION INTRAVENOUS ONCE
Status: COMPLETED | OUTPATIENT
Start: 2023-03-27 | End: 2023-03-27

## 2023-03-27 RX ADMIN — SODIUM CHLORIDE 1000 ML: 9 INJECTION, SOLUTION INTRAVENOUS at 07:34

## 2023-03-27 RX ADMIN — SODIUM CHLORIDE 1000 ML: 9 INJECTION, SOLUTION INTRAVENOUS at 09:05

## 2023-03-27 ASSESSMENT — ENCOUNTER SYMPTOMS
ABDOMINAL PAIN: 0
CHOKING: 0
EYE REDNESS: 0
COLOR CHANGE: 0
RECTAL PAIN: 0
VOICE CHANGE: 0
DIARRHEA: 0
PHOTOPHOBIA: 0
TROUBLE SWALLOWING: 0
CHEST TIGHTNESS: 0

## 2023-03-27 ASSESSMENT — PAIN - FUNCTIONAL ASSESSMENT: PAIN_FUNCTIONAL_ASSESSMENT: NONE - DENIES PAIN

## 2023-03-27 NOTE — ED TRIAGE NOTES
Pt arrives via [facility] coming from home. EMS was called out for cardiac arrest. Pt was found unresponsive with a GCS of 7. Pt was given a total of 2 doses of narcan with improvement to a GCS of 13 per EMS. . Pt denies taking opiates or any other drugs. Pt A&Ox4 at time of triage.

## 2023-03-27 NOTE — ED NOTES
Pt able to stand and ambulate to [place]unningham with RN.  Pt endorses dizziness and had to stop for a few seconds then able to return to bed with steady gait. (+)dizziness but improving      Margot Keller RN  03/27/23 [id number]

## 2023-03-27 NOTE — ED NOTES
Pt given paper scrubs and ambulatory to wheelchair without distress or instability.       Nan Abbott RN  03/27/23 1037

## 2023-03-27 NOTE — ED NOTES
I have reviewed discharge instructions with the patient. The patient verbalized understanding. Patient left ED via Discharge Method: ambulatory to Home with son. Opportunity for questions and clarification provided. Patient given 0 scripts. To continue your aftercare when you leave the hospital, you may receive an automated call from our care team to check in on how you are doing. This is a free service and part of our promise to provide the best care and service to meet your aftercare needs.  If you have questions, or wish to unsubscribe from this service please call [phone]. Thank you for Choosing our [facility] Emergency Department.         John Townsend, ERVIN  03/27/23 Alexandrea Lam, RN  03/27/23 [id number]

## 2023-03-27 NOTE — ED PROVIDER NOTES
transfer care to oncoming physician    Son at bedside and updated on plan of care       Risk of Complications and/or Morbidity of Patient Management:  Prescription drug management performed and Shared medical decision making was utilized in creating the patients health plan today. Kristen Sweeney is a 79 y.o. male who presents to the Emergency Department with chief complaint of    Chief Complaint   Patient presents with    Drug Overdose      Patient presents to the ER with complaints of altered mental status. EMS was called for possible \"unresponsive patient\". Apparently patient's son had difficulty awakening him. EMS when he got there states patient symptoms signs were mostly normal but he was not responding with slow breathing. Was given Narcan in route with some improvement in symptoms. Patient now able to be easily aroused and answer yes and no questions. He has no complaints. Cannot remember what happened to him earlier. The history is provided by the patient. Altered Mental Status  Presenting symptoms: confusion and unresponsiveness    Severity:  Moderate  Duration:  2 hours  Progression:  Partially resolved  Chronicity:  New  Associated symptoms: no abdominal pain and no light-headedness       Review of Systems   Constitutional:  Negative for diaphoresis and fatigue. HENT:  Negative for congestion, dental problem, trouble swallowing and voice change. Eyes:  Negative for photophobia and redness. Respiratory:  Negative for choking and chest tightness. Cardiovascular:  Negative for chest pain and leg swelling. Gastrointestinal:  Negative for abdominal pain, diarrhea and rectal pain. Endocrine: Negative for polyphagia and polyuria. Genitourinary:  Negative for flank pain and frequency. Musculoskeletal:  Negative for joint swelling and myalgias. Skin:  Negative for color change and pallor. Allergic/Immunologic: Negative for food allergies.    Neurological:  Negative for

## 2023-04-05 ENCOUNTER — TELEMEDICINE (OUTPATIENT)
Dept: PRIMARY CARE CLINIC | Facility: CLINIC | Age: 68
End: 2023-04-05
Payer: MEDICARE

## 2023-04-05 DIAGNOSIS — I10 PRIMARY HYPERTENSION: ICD-10-CM

## 2023-04-05 DIAGNOSIS — E86.0 DEHYDRATION: Primary | ICD-10-CM

## 2023-04-05 DIAGNOSIS — E11.40 TYPE 2 DIABETES MELLITUS WITH DIABETIC NEUROPATHY, WITHOUT LONG-TERM CURRENT USE OF INSULIN (HCC): ICD-10-CM

## 2023-04-05 PROCEDURE — 99442 PR PHYS/QHP TELEPHONE EVALUATION 11-20 MIN: CPT | Performed by: FAMILY MEDICINE

## 2023-05-05 PROBLEM — E86.0 DEHYDRATION: Status: RESOLVED | Noted: 2023-04-05 | Resolved: 2023-05-05

## 2023-06-24 DIAGNOSIS — E11.40 TYPE 2 DIABETES MELLITUS WITH DIABETIC NEUROPATHY, WITHOUT LONG-TERM CURRENT USE OF INSULIN (HCC): ICD-10-CM

## 2023-06-27 RX ORDER — DULAGLUTIDE 0.75 MG/.5ML
INJECTION, SOLUTION SUBCUTANEOUS
Qty: 2 ML | OUTPATIENT
Start: 2023-06-27

## 2023-08-08 DIAGNOSIS — Z13.228 SCREENING FOR PHENYLKETONURIA (PKU): ICD-10-CM

## 2023-08-08 DIAGNOSIS — E03.9 MYXEDEMA HEART DISEASE: ICD-10-CM

## 2023-08-08 DIAGNOSIS — E03.8 TOXIC DIFFUSE GOITER WITH PRETIBIAL MYXEDEMA: ICD-10-CM

## 2023-08-08 DIAGNOSIS — I51.9 MYXEDEMA HEART DISEASE: ICD-10-CM

## 2023-08-08 DIAGNOSIS — E55.9 AVITAMINOSIS D: ICD-10-CM

## 2023-08-08 DIAGNOSIS — E03.8 SECONDARY HYPOTHYROIDISM: ICD-10-CM

## 2023-08-08 DIAGNOSIS — R79.9 ABNORMAL BLOOD CHEMISTRY: ICD-10-CM

## 2023-08-08 DIAGNOSIS — E11.8 DM (DIABETES MELLITUS), TYPE 2 WITH COMPLICATIONS (HCC): Primary | ICD-10-CM

## 2023-08-08 DIAGNOSIS — E05.00 TOXIC DIFFUSE GOITER WITH PRETIBIAL MYXEDEMA: ICD-10-CM

## 2023-08-30 ENCOUNTER — OFFICE VISIT (OUTPATIENT)
Dept: PRIMARY CARE CLINIC | Facility: CLINIC | Age: 68
End: 2023-08-30
Payer: MEDICARE

## 2023-08-30 VITALS
WEIGHT: 179.8 LBS | DIASTOLIC BLOOD PRESSURE: 78 MMHG | SYSTOLIC BLOOD PRESSURE: 134 MMHG | HEIGHT: 69 IN | BODY MASS INDEX: 26.63 KG/M2 | TEMPERATURE: 97.3 F | HEART RATE: 68 BPM | OXYGEN SATURATION: 98 %

## 2023-08-30 DIAGNOSIS — E11.40 TYPE 2 DIABETES MELLITUS WITH DIABETIC NEUROPATHY, WITHOUT LONG-TERM CURRENT USE OF INSULIN (HCC): ICD-10-CM

## 2023-08-30 DIAGNOSIS — F90.9 ATTENTION DEFICIT HYPERACTIVITY DISORDER (ADHD), UNSPECIFIED ADHD TYPE: ICD-10-CM

## 2023-08-30 DIAGNOSIS — Z13.228 SCREENING FOR PHENYLKETONURIA (PKU): ICD-10-CM

## 2023-08-30 DIAGNOSIS — Z23 NEED FOR SHINGLES VACCINE: ICD-10-CM

## 2023-08-30 DIAGNOSIS — I51.9 MYXEDEMA HEART DISEASE: ICD-10-CM

## 2023-08-30 DIAGNOSIS — J06.9 UPPER RESPIRATORY INFECTION WITH COUGH AND CONGESTION: ICD-10-CM

## 2023-08-30 DIAGNOSIS — E11.8 DM (DIABETES MELLITUS), TYPE 2 WITH COMPLICATIONS (HCC): ICD-10-CM

## 2023-08-30 DIAGNOSIS — E05.00 TOXIC DIFFUSE GOITER WITH PRETIBIAL MYXEDEMA: ICD-10-CM

## 2023-08-30 DIAGNOSIS — E03.8 TOXIC DIFFUSE GOITER WITH PRETIBIAL MYXEDEMA: ICD-10-CM

## 2023-08-30 DIAGNOSIS — E03.9 MYXEDEMA HEART DISEASE: ICD-10-CM

## 2023-08-30 DIAGNOSIS — I10 PRIMARY HYPERTENSION: ICD-10-CM

## 2023-08-30 DIAGNOSIS — E55.9 VITAMIN D DEFICIENCY: ICD-10-CM

## 2023-08-30 DIAGNOSIS — E78.2 MIXED HYPERLIPIDEMIA: ICD-10-CM

## 2023-08-30 DIAGNOSIS — R79.9 ABNORMAL BLOOD CHEMISTRY: ICD-10-CM

## 2023-08-30 DIAGNOSIS — E55.9 AVITAMINOSIS D: ICD-10-CM

## 2023-08-30 DIAGNOSIS — E03.8 SECONDARY HYPOTHYROIDISM: ICD-10-CM

## 2023-08-30 DIAGNOSIS — E11.40 TYPE 2 DIABETES MELLITUS WITH DIABETIC NEUROPATHY, WITHOUT LONG-TERM CURRENT USE OF INSULIN (HCC): Primary | ICD-10-CM

## 2023-08-30 LAB
25(OH)D3 SERPL-MCNC: 29.3 NG/ML (ref 30–100)
ALBUMIN SERPL-MCNC: 4.3 G/DL (ref 3.2–4.6)
ALBUMIN/GLOB SERPL: 1.3 (ref 0.4–1.6)
ALP SERPL-CCNC: 67 U/L (ref 50–136)
ALT SERPL-CCNC: 30 U/L (ref 12–65)
ANION GAP SERPL CALC-SCNC: 7 MMOL/L (ref 2–11)
AST SERPL-CCNC: 20 U/L (ref 15–37)
BASOPHILS # BLD: 0.1 K/UL (ref 0–0.2)
BASOPHILS NFR BLD: 1 % (ref 0–2)
BILIRUB SERPL-MCNC: 0.9 MG/DL (ref 0.2–1.1)
BUN SERPL-MCNC: 16 MG/DL (ref 8–23)
CALCIUM SERPL-MCNC: 9.9 MG/DL (ref 8.3–10.4)
CHLORIDE SERPL-SCNC: 102 MMOL/L (ref 101–110)
CHOLEST SERPL-MCNC: 109 MG/DL
CO2 SERPL-SCNC: 26 MMOL/L (ref 21–32)
CREAT SERPL-MCNC: 1.2 MG/DL (ref 0.8–1.5)
DIFFERENTIAL METHOD BLD: ABNORMAL
EOSINOPHIL # BLD: 0 K/UL (ref 0–0.8)
EOSINOPHIL NFR BLD: 1 % (ref 0.5–7.8)
ERYTHROCYTE [DISTWIDTH] IN BLOOD BY AUTOMATED COUNT: 13.6 % (ref 11.9–14.6)
EST. AVERAGE GLUCOSE BLD GHB EST-MCNC: 177 MG/DL
GLOBULIN SER CALC-MCNC: 3.2 G/DL (ref 2.8–4.5)
GLUCOSE SERPL-MCNC: 160 MG/DL (ref 65–100)
HBA1C MFR BLD: 7.8 % (ref 4.8–5.6)
HCT VFR BLD AUTO: 44.6 % (ref 41.1–50.3)
HDLC SERPL-MCNC: 51 MG/DL (ref 40–60)
HDLC SERPL: 2.1
HGB BLD-MCNC: 15.3 G/DL (ref 13.6–17.2)
IMM GRANULOCYTES # BLD AUTO: 0 K/UL (ref 0–0.5)
IMM GRANULOCYTES NFR BLD AUTO: 0 % (ref 0–5)
LDLC SERPL CALC-MCNC: 40 MG/DL
LYMPHOCYTES # BLD: 1.5 K/UL (ref 0.5–4.6)
LYMPHOCYTES NFR BLD: 20 % (ref 13–44)
MCH RBC QN AUTO: 28.1 PG (ref 26.1–32.9)
MCHC RBC AUTO-ENTMCNC: 34.3 G/DL (ref 31.4–35)
MCV RBC AUTO: 81.8 FL (ref 82–102)
MONOCYTES # BLD: 0.5 K/UL (ref 0.1–1.3)
MONOCYTES NFR BLD: 7 % (ref 4–12)
NEUTS SEG # BLD: 5.4 K/UL (ref 1.7–8.2)
NEUTS SEG NFR BLD: 71 % (ref 43–78)
NRBC # BLD: 0 K/UL (ref 0–0.2)
PLATELET # BLD AUTO: 176 K/UL (ref 150–450)
PMV BLD AUTO: 11.1 FL (ref 9.4–12.3)
POTASSIUM SERPL-SCNC: 4.6 MMOL/L (ref 3.5–5.1)
PROT SERPL-MCNC: 7.5 G/DL (ref 6.3–8.2)
RBC # BLD AUTO: 5.45 M/UL (ref 4.23–5.6)
SODIUM SERPL-SCNC: 135 MMOL/L (ref 133–143)
T4 FREE SERPL-MCNC: 1 NG/DL (ref 0.78–1.46)
TRIGL SERPL-MCNC: 90 MG/DL (ref 35–150)
TSH, 3RD GENERATION: 1.5 UIU/ML (ref 0.36–3.74)
VLDLC SERPL CALC-MCNC: 18 MG/DL (ref 6–23)
WBC # BLD AUTO: 7.5 K/UL (ref 4.3–11.1)

## 2023-08-30 PROCEDURE — 1123F ACP DISCUSS/DSCN MKR DOCD: CPT | Performed by: FAMILY MEDICINE

## 2023-08-30 PROCEDURE — 3078F DIAST BP <80 MM HG: CPT | Performed by: FAMILY MEDICINE

## 2023-08-30 PROCEDURE — 99214 OFFICE O/P EST MOD 30 MIN: CPT | Performed by: FAMILY MEDICINE

## 2023-08-30 PROCEDURE — 3074F SYST BP LT 130 MM HG: CPT | Performed by: FAMILY MEDICINE

## 2023-08-30 PROCEDURE — 3051F HG A1C>EQUAL 7.0%<8.0%: CPT | Performed by: FAMILY MEDICINE

## 2023-08-30 RX ORDER — LOSARTAN POTASSIUM 50 MG/1
50 TABLET ORAL DAILY
Qty: 90 TABLET | Refills: 1 | Status: SHIPPED | OUTPATIENT
Start: 2023-08-30

## 2023-08-30 RX ORDER — ALBUTEROL SULFATE 90 UG/1
2 AEROSOL, METERED RESPIRATORY (INHALATION) 4 TIMES DAILY PRN
Qty: 18 G | Refills: 5 | Status: SHIPPED | OUTPATIENT
Start: 2023-08-30

## 2023-08-30 RX ORDER — ERGOCALCIFEROL 1.25 MG/1
50000 CAPSULE ORAL WEEKLY
Qty: 12 CAPSULE | Refills: 1 | Status: SHIPPED | OUTPATIENT
Start: 2023-08-30

## 2023-08-30 RX ORDER — ATORVASTATIN CALCIUM 10 MG/1
10 TABLET, FILM COATED ORAL DAILY
Qty: 90 TABLET | Refills: 1 | Status: SHIPPED | OUTPATIENT
Start: 2023-08-30

## 2023-08-30 RX ORDER — ZOSTER VACCINE RECOMBINANT, ADJUVANTED 50 MCG/0.5
0.5 KIT INTRAMUSCULAR SEE ADMIN INSTRUCTIONS
Qty: 0.5 ML | Refills: 0 | Status: SHIPPED | OUTPATIENT
Start: 2023-08-30 | End: 2024-02-26

## 2023-08-30 RX ORDER — DEXTROAMPHETAMINE SACCHARATE, AMPHETAMINE ASPARTATE, DEXTROAMPHETAMINE SULFATE AND AMPHETAMINE SULFATE 7.5; 7.5; 7.5; 7.5 MG/1; MG/1; MG/1; MG/1
30 TABLET ORAL 2 TIMES DAILY
Qty: 30 TABLET | Refills: 0 | Status: SHIPPED | OUTPATIENT
Start: 2023-08-30 | End: 2023-09-30

## 2023-08-30 RX ORDER — GLIMEPIRIDE 4 MG/1
4 TABLET ORAL
Qty: 90 TABLET | Refills: 1 | Status: SHIPPED | OUTPATIENT
Start: 2023-08-30

## 2023-08-30 SDOH — ECONOMIC STABILITY: FOOD INSECURITY: WITHIN THE PAST 12 MONTHS, YOU WORRIED THAT YOUR FOOD WOULD RUN OUT BEFORE YOU GOT MONEY TO BUY MORE.: NEVER TRUE

## 2023-08-30 SDOH — ECONOMIC STABILITY: HOUSING INSECURITY
IN THE LAST 12 MONTHS, WAS THERE A TIME WHEN YOU DID NOT HAVE A STEADY PLACE TO SLEEP OR SLEPT IN A SHELTER (INCLUDING NOW)?: NO

## 2023-08-30 SDOH — ECONOMIC STABILITY: FOOD INSECURITY: WITHIN THE PAST 12 MONTHS, THE FOOD YOU BOUGHT JUST DIDN'T LAST AND YOU DIDN'T HAVE MONEY TO GET MORE.: NEVER TRUE

## 2023-08-30 SDOH — ECONOMIC STABILITY: INCOME INSECURITY: HOW HARD IS IT FOR YOU TO PAY FOR THE VERY BASICS LIKE FOOD, HOUSING, MEDICAL CARE, AND HEATING?: NOT HARD AT ALL

## 2023-08-30 ASSESSMENT — PATIENT HEALTH QUESTIONNAIRE - PHQ9
SUM OF ALL RESPONSES TO PHQ9 QUESTIONS 1 & 2: 0
2. FEELING DOWN, DEPRESSED OR HOPELESS: 0
SUM OF ALL RESPONSES TO PHQ QUESTIONS 1-9: 0
1. LITTLE INTEREST OR PLEASURE IN DOING THINGS: 0

## 2023-08-30 NOTE — PROGRESS NOTES
diabetes management, taking once weekly IM  Adderall for Adult ADHD, see note above, new start  Amaryl and metformin for diabetes management  Albuterol hfa for lung clearance  Lipitor for cholesterol management, diabetes. Losartan for BP management, BP stable 134/74, also renal protective effect from diabetes  Vit d for vit d deficiency, (this will replace OTC medication)    Pt will RTC in 3 months recheck, labs indicated cmp, hgba1c, lipid, cbc, psa, vit d        HISTORY:  No Known Allergies  Past Medical History:   Diagnosis Date    Arrhythmia     pt denies history    Degenerative joint disease of left acromioclavicular joint     Diabetes ([address])     type 2. normal fasting (180-220).  Jardiance Rx 9/18/19 in addition to metfromin by PCP    Strain of muscle(s) and tendon(s) of the rotator cuff of left shoulder, initial encounter      Past Surgical History:   Procedure Laterality Date    COLONOSCOPY N/A 1/19/2022    COLONOSCOPY/ BMI 27 performed by Wally Fatima MD at [facility] ENDOSCOPY    ORTHOPEDIC SURGERY      LT wrist    ORTHOPEDIC SURGERY      RT lower herina    ORTHOPEDIC SURGERY      knee     Family History   Problem Relation Age of Onset    Osteoarthritis Mother     No Known Problems Brother     No Known Problems Sister     Diabetes Father     Diabetes Mother      Social History     Socioeconomic History    Marital status:      Spouse name: Not on file    Number of children: Not on file    Years of education: Not on file    Highest education level: Not on file   Occupational History    Not on file   Tobacco Use    Smoking status: Former    Smokeless tobacco: Never   Substance and Sexual Activity    Alcohol use: No    Drug use: No    Sexual activity: Not on file   Other Topics Concern    Not on file   Social History Narrative    Not on file     Social Determinants of Health     Financial Resource Strain: Low Risk     Difficulty of Paying Living Expenses: Not hard at all   Food Insecurity: No Food Insecurity

## 2023-08-31 DIAGNOSIS — D50.8 IRON DEFICIENCY ANEMIA SECONDARY TO INADEQUATE DIETARY IRON INTAKE: Primary | ICD-10-CM

## 2023-08-31 RX ORDER — LANOLIN ALCOHOL/MO/W.PET/CERES
325 CREAM (GRAM) TOPICAL 2 TIMES DAILY
Qty: 180 TABLET | Refills: 1 | Status: SHIPPED | OUTPATIENT
Start: 2023-08-31 | End: 2024-02-27

## 2023-09-25 ENCOUNTER — TELEPHONE (OUTPATIENT)
Dept: PRIMARY CARE CLINIC | Facility: CLINIC | Age: 68
End: 2023-09-25

## 2023-09-28 NOTE — TELEPHONE ENCOUNTER
Patient called back and he reports to focusing a lot better on the Adderall and states that only a 15 day supply was sent to the pharmacy. Requesting the rest of his medication sent in. Was seen 8/30.  Patient is aware that Dr Luis Alberto Julien is out of office

## 2023-09-28 NOTE — TELEPHONE ENCOUNTER
Tried to call patient back, was sent to [facility]. In last note, Dr Kaia Burrell states he was sending in one month supply of ADD med and patient was to report back to PCP on how he was doing on the medication to get the extra refills.

## 2023-10-01 DIAGNOSIS — F90.9 ATTENTION DEFICIT HYPERACTIVITY DISORDER (ADHD), UNSPECIFIED ADHD TYPE: ICD-10-CM

## 2023-10-01 RX ORDER — DEXTROAMPHETAMINE SACCHARATE, AMPHETAMINE ASPARTATE, DEXTROAMPHETAMINE SULFATE AND AMPHETAMINE SULFATE 7.5; 7.5; 7.5; 7.5 MG/1; MG/1; MG/1; MG/1
30 TABLET ORAL 2 TIMES DAILY
Qty: 60 TABLET | Refills: 0 | Status: SHIPPED | OUTPATIENT
Start: 2023-10-01 | End: 2023-11-01

## 2023-10-10 ENCOUNTER — OFFICE VISIT (OUTPATIENT)
Dept: PRIMARY CARE CLINIC | Facility: CLINIC | Age: 68
End: 2023-10-10
Payer: MEDICARE

## 2023-10-10 VITALS
SYSTOLIC BLOOD PRESSURE: 129 MMHG | OXYGEN SATURATION: 98 % | HEIGHT: 69 IN | WEIGHT: 180 LBS | TEMPERATURE: 98 F | BODY MASS INDEX: 26.66 KG/M2 | DIASTOLIC BLOOD PRESSURE: 76 MMHG | HEART RATE: 86 BPM

## 2023-10-10 DIAGNOSIS — Z48.816 ENCOUNTER FOR ASSESSMENT OF CIRCUMCISION: Primary | ICD-10-CM

## 2023-10-10 DIAGNOSIS — F90.0 ATTENTION DEFICIT HYPERACTIVITY DISORDER (ADHD), PREDOMINANTLY INATTENTIVE TYPE: ICD-10-CM

## 2023-10-10 PROCEDURE — 3078F DIAST BP <80 MM HG: CPT | Performed by: FAMILY MEDICINE

## 2023-10-10 PROCEDURE — 1123F ACP DISCUSS/DSCN MKR DOCD: CPT | Performed by: FAMILY MEDICINE

## 2023-10-10 PROCEDURE — 99214 OFFICE O/P EST MOD 30 MIN: CPT | Performed by: FAMILY MEDICINE

## 2023-10-10 PROCEDURE — 3074F SYST BP LT 130 MM HG: CPT | Performed by: FAMILY MEDICINE

## 2023-10-10 RX ORDER — DEXTROAMPHETAMINE SACCHARATE, AMPHETAMINE ASPARTATE, DEXTROAMPHETAMINE SULFATE AND AMPHETAMINE SULFATE 7.5; 7.5; 7.5; 7.5 MG/1; MG/1; MG/1; MG/1
30 TABLET ORAL 2 TIMES DAILY
Qty: 60 TABLET | Refills: 0 | Status: SHIPPED | OUTPATIENT
Start: 2023-11-30 | End: 2023-12-30

## 2023-10-10 RX ORDER — DEXTROAMPHETAMINE SACCHARATE, AMPHETAMINE ASPARTATE, DEXTROAMPHETAMINE SULFATE AND AMPHETAMINE SULFATE 7.5; 7.5; 7.5; 7.5 MG/1; MG/1; MG/1; MG/1
30 TABLET ORAL 2 TIMES DAILY
Qty: 60 TABLET | Refills: 0 | Status: SHIPPED | OUTPATIENT
Start: 2023-10-31 | End: 2023-11-30

## 2023-10-10 NOTE — PROGRESS NOTES
Yarely Inman MD  [address] Josias Johns, [address] [facility]  Ph No:  [phone]  Fax:  [phone]:  Chief Complaint   Patient presents with    Referral - General     Pt in for a referral to urology. HISTORY OF PRESENT ILLNESS:  Mr. Autumn Zarate is a 79 y.o. male. Pt presents for acute care visit. Pt says has never been circumcised and the foreskin has become tight. Pt reports he is able to urinate but would like relief from foreskin tightness. Pt desires referral to urology for circumcision. Pt reports prior referral but says he ever received a call for an appt. Pt is given another referral.  Pt is advised urology should be in contact with him in the next two weeks. He is advised it is possible he missed their call. Pt is using ADHD medication adderall. One month of medication is sent but two additional months are due to be sent in this current cycle of medication refills. Pt is given two additional months of refills, sent to pharmacy. Pt reports no particular concerns or side effects of this medication. Working as expected. BP is stable 129/76, continue current therapy. Pt will RTC at next scheduled visit 11/22/23. HISTORY:  No Known Allergies  Past Medical History:   Diagnosis Date    Arrhythmia     pt denies history    Degenerative joint disease of left acromioclavicular joint     Diabetes ([address])     type 2. normal fasting (180-220).  Jardiance Rx 9/18/19 in addition to metfromin by PCP    Strain of muscle(s) and tendon(s) of the rotator cuff of left shoulder, initial encounter      Past Surgical History:   Procedure Laterality Date    COLONOSCOPY N/A 1/19/2022    COLONOSCOPY/ BMI 27 performed by Eva Leong MD at [facility] ENDOSCOPY    ORTHOPEDIC SURGERY      LT wrist    ORTHOPEDIC SURGERY      RT lower herina    ORTHOPEDIC SURGERY      knee     Family History   Problem Relation Age of Onset    Osteoarthritis Mother     No Known

## 2023-10-31 ENCOUNTER — TELEPHONE (OUTPATIENT)
Dept: UROLOGY | Age: 68
End: 2023-10-31

## 2023-10-31 ENCOUNTER — OFFICE VISIT (OUTPATIENT)
Dept: UROLOGY | Age: 68
End: 2023-10-31
Payer: MEDICARE

## 2023-10-31 DIAGNOSIS — N48.1 BALANITIS: Primary | ICD-10-CM

## 2023-10-31 DIAGNOSIS — B37.49 YEAST DERMATITIS OF PENIS: ICD-10-CM

## 2023-10-31 DIAGNOSIS — N47.1 PHIMOSIS: ICD-10-CM

## 2023-10-31 DIAGNOSIS — N40.0 BPH WITHOUT URINARY OBSTRUCTION: ICD-10-CM

## 2023-10-31 LAB
BILIRUBIN, URINE, POC: NEGATIVE
BLOOD URINE, POC: NORMAL
GLUCOSE URINE, POC: NEGATIVE
KETONES, URINE, POC: NEGATIVE
LEUKOCYTE ESTERASE, URINE, POC: NORMAL
NITRITE, URINE, POC: NEGATIVE
PH, URINE, POC: 6.5 (ref 4.6–8)
PROTEIN,URINE, POC: NEGATIVE
SPECIFIC GRAVITY, URINE, POC: 1.01 (ref 1–1.03)
URINALYSIS CLARITY, POC: NORMAL
URINALYSIS COLOR, POC: NORMAL
UROBILINOGEN, POC: NORMAL

## 2023-10-31 PROCEDURE — 1123F ACP DISCUSS/DSCN MKR DOCD: CPT | Performed by: UROLOGY

## 2023-10-31 PROCEDURE — 81003 URINALYSIS AUTO W/O SCOPE: CPT | Performed by: UROLOGY

## 2023-10-31 PROCEDURE — 99204 OFFICE O/P NEW MOD 45 MIN: CPT | Performed by: UROLOGY

## 2023-10-31 ASSESSMENT — ENCOUNTER SYMPTOMS
RESPIRATORY NEGATIVE: 1
EYES NEGATIVE: 1

## 2023-10-31 NOTE — PROGRESS NOTES
coloration and turgor      Urinalysis  UA - Dipstick  Results for orders placed or performed in visit on 10/31/23   AMB POC URINALYSIS DIP STICK AUTO W/O MICRO   Result Value Ref Range    Color (UA POC)      Clarity (UA POC)      Glucose, Urine, POC Negative Negative    Bilirubin, Urine, POC Negative Negative    KETONES, Urine, POC Negative Negative    Specific Gravity, Urine, POC 1.015 1.001 - 1.035    Blood (UA POC) Trace Negative    pH, Urine, POC 6.5 4.6 - 8.0    Protein, Urine, POC Negative Negative    Urobilinogen, POC 1 mg/dL     Nitrite, Urine, POC Negative Negative    Leukocyte Esterase, Urine, POC Small Negative         UA - Micro  WBC - 0  RBC - 0  Bacteria - 0  Epith - 0    Assessment/Plan    ICD-10-CM    1. Balanitis  N48.1 AMB POC URINALYSIS DIP STICK AUTO W/O MICRO      2. Yeast dermatitis of penis  B37.49 AMB POC URINALYSIS DIP STICK AUTO W/O MICRO      3. BPH without urinary obstruction  N40.0 AMB POC URINALYSIS DIP STICK AUTO W/O MICRO        All tx options reviewed. He has elected to proceed with a circumcision. All risks, benefits and alternatives to the above mentioned procedure were discussed and the patient is willing to proceed at this time. ABIGAIL CATALAN,     Total time for today's encounter including chart review, result review, documentation and face to face encounter was 46 minutes.

## 2023-11-22 ENCOUNTER — PREP FOR PROCEDURE (OUTPATIENT)
Dept: UROLOGY | Age: 68
End: 2023-11-22

## 2023-11-22 DIAGNOSIS — E78.5 HYPERLIPIDEMIA, UNSPECIFIED HYPERLIPIDEMIA TYPE: ICD-10-CM

## 2023-11-22 DIAGNOSIS — R73.01 IFG (IMPAIRED FASTING GLUCOSE): Primary | ICD-10-CM

## 2023-11-22 DIAGNOSIS — Z13.228 SCREENING FOR METABOLIC DISORDER: ICD-10-CM

## 2023-11-22 DIAGNOSIS — Z12.5 ENCOUNTER FOR PROSTATE CANCER SCREENING: ICD-10-CM

## 2023-11-22 DIAGNOSIS — Z12.5 ENCOUNTER FOR PROSTATE CANCER SCREENING: Primary | ICD-10-CM

## 2023-11-22 DIAGNOSIS — R79.9 ABNORMAL BLOOD CHEMISTRY: ICD-10-CM

## 2023-11-22 DIAGNOSIS — R53.83 FATIGUE, UNSPECIFIED TYPE: ICD-10-CM

## 2023-11-22 DIAGNOSIS — E55.9 VITAMIN D DEFICIENCY: ICD-10-CM

## 2023-11-22 DIAGNOSIS — E11.40 TYPE 2 DIABETES MELLITUS WITH DIABETIC NEUROPATHY, WITHOUT LONG-TERM CURRENT USE OF INSULIN (HCC): ICD-10-CM

## 2023-11-22 DIAGNOSIS — R73.01 IFG (IMPAIRED FASTING GLUCOSE): ICD-10-CM

## 2023-11-22 PROBLEM — N47.1 PHIMOSIS: Status: ACTIVE | Noted: 2023-10-31

## 2023-11-22 LAB
BASOPHILS # BLD: 0 K/UL (ref 0–0.2)
BASOPHILS NFR BLD: 1 % (ref 0–2)
DIFFERENTIAL METHOD BLD: ABNORMAL
EOSINOPHIL # BLD: 0.1 K/UL (ref 0–0.8)
EOSINOPHIL NFR BLD: 1 % (ref 0.5–7.8)
ERYTHROCYTE [DISTWIDTH] IN BLOOD BY AUTOMATED COUNT: 13.5 % (ref 11.9–14.6)
HCT VFR BLD AUTO: 40.5 % (ref 41.1–50.3)
HGB BLD-MCNC: 13.2 G/DL (ref 13.6–17.2)
IMM GRANULOCYTES # BLD AUTO: 0 K/UL (ref 0–0.5)
IMM GRANULOCYTES NFR BLD AUTO: 0 % (ref 0–5)
LYMPHOCYTES # BLD: 1.5 K/UL (ref 0.5–4.6)
LYMPHOCYTES NFR BLD: 18 % (ref 13–44)
MCH RBC QN AUTO: 27.8 PG (ref 26.1–32.9)
MCHC RBC AUTO-ENTMCNC: 32.6 G/DL (ref 31.4–35)
MCV RBC AUTO: 85.3 FL (ref 82–102)
MONOCYTES # BLD: 0.5 K/UL (ref 0.1–1.3)
MONOCYTES NFR BLD: 6 % (ref 4–12)
NEUTS SEG # BLD: 6.2 K/UL (ref 1.7–8.2)
NEUTS SEG NFR BLD: 74 % (ref 43–78)
NRBC # BLD: 0 K/UL (ref 0–0.2)
PLATELET # BLD AUTO: 195 K/UL (ref 150–450)
PMV BLD AUTO: 11 FL (ref 9.4–12.3)
PSA SERPL-MCNC: 2.5 NG/ML
RBC # BLD AUTO: 4.75 M/UL (ref 4.23–5.6)
WBC # BLD AUTO: 8.3 K/UL (ref 4.3–11.1)

## 2023-11-22 NOTE — TELEPHONE ENCOUNTER
----- Message from Darleen Cordova DO sent at 10/31/2023 11:25 AM EDT -----  Regarding: surg  Circ  30min  Gen  No labs  Ancef 2g IV preop
Date ok per pt, will schedule
Procedures: Procedure(s):   CIRCUMCISION   Date: 12/28/2023   Time: 1203   Location: [facility] MAIN OR 11
Pt sent [employer] message to schedule.   Asked if can do 12/28
Request sent to scheduling for 12/28
03-Jul-2018 23:01

## 2023-11-23 LAB
25(OH)D3 SERPL-MCNC: 48.5 NG/ML (ref 30–100)
ALBUMIN SERPL-MCNC: 4.1 G/DL (ref 3.2–4.6)
ALBUMIN/GLOB SERPL: 1.2 (ref 0.4–1.6)
ALP SERPL-CCNC: 71 U/L (ref 50–136)
ALT SERPL-CCNC: 31 U/L (ref 12–65)
ANION GAP SERPL CALC-SCNC: 7 MMOL/L (ref 2–11)
AST SERPL-CCNC: 18 U/L (ref 15–37)
BILIRUB SERPL-MCNC: 0.7 MG/DL (ref 0.2–1.1)
BUN SERPL-MCNC: 14 MG/DL (ref 8–23)
CALCIUM SERPL-MCNC: 9 MG/DL (ref 8.3–10.4)
CHLORIDE SERPL-SCNC: 103 MMOL/L (ref 101–110)
CHOLEST SERPL-MCNC: 84 MG/DL
CO2 SERPL-SCNC: 25 MMOL/L (ref 21–32)
CREAT SERPL-MCNC: 0.9 MG/DL (ref 0.8–1.5)
EST. AVERAGE GLUCOSE BLD GHB EST-MCNC: 131 MG/DL
GLOBULIN SER CALC-MCNC: 3.5 G/DL (ref 2.8–4.5)
GLUCOSE SERPL-MCNC: 120 MG/DL (ref 65–100)
HBA1C MFR BLD: 6.2 % (ref 4.8–5.6)
HDLC SERPL-MCNC: 44 MG/DL (ref 40–60)
HDLC SERPL: 1.9
LDLC SERPL CALC-MCNC: 27.4 MG/DL
POTASSIUM SERPL-SCNC: 4.3 MMOL/L (ref 3.5–5.1)
PROT SERPL-MCNC: 7.6 G/DL (ref 6.3–8.2)
SODIUM SERPL-SCNC: 135 MMOL/L (ref 133–143)
TRIGL SERPL-MCNC: 63 MG/DL (ref 35–150)
TSH, 3RD GENERATION: 1.08 UIU/ML (ref 0.36–3.74)
VLDLC SERPL CALC-MCNC: 12.6 MG/DL (ref 6–23)

## 2023-11-24 DIAGNOSIS — R53.83 FATIGUE, UNSPECIFIED TYPE: ICD-10-CM

## 2023-11-24 DIAGNOSIS — Z12.5 ENCOUNTER FOR PROSTATE CANCER SCREENING: ICD-10-CM

## 2023-11-24 LAB — T4 FREE SERPL-MCNC: 1 NG/DL (ref 0.78–1.46)

## 2023-11-27 ENCOUNTER — HOSPITAL ENCOUNTER (OUTPATIENT)
Dept: GENERAL RADIOLOGY | Age: 68
Discharge: HOME | End: 2023-11-30
Payer: MEDICARE

## 2023-11-27 ENCOUNTER — OFFICE VISIT (OUTPATIENT)
Dept: PRIMARY CARE CLINIC | Facility: CLINIC | Age: 68
End: 2023-11-27
Payer: MEDICARE

## 2023-11-27 VITALS
HEIGHT: 68 IN | OXYGEN SATURATION: 96 % | TEMPERATURE: 97 F | SYSTOLIC BLOOD PRESSURE: 138 MMHG | HEART RATE: 82 BPM | BODY MASS INDEX: 27.01 KG/M2 | DIASTOLIC BLOOD PRESSURE: 81 MMHG | WEIGHT: 178.2 LBS

## 2023-11-27 DIAGNOSIS — R68.84 PAIN IN LOWER JAW: ICD-10-CM

## 2023-11-27 DIAGNOSIS — Z00.00 MEDICARE ANNUAL WELLNESS VISIT, SUBSEQUENT: Primary | ICD-10-CM

## 2023-11-27 DIAGNOSIS — F90.0 ATTENTION DEFICIT HYPERACTIVITY DISORDER (ADHD), PREDOMINANTLY INATTENTIVE TYPE: ICD-10-CM

## 2023-11-27 DIAGNOSIS — E55.9 VITAMIN D DEFICIENCY: ICD-10-CM

## 2023-11-27 DIAGNOSIS — E11.40 TYPE 2 DIABETES MELLITUS WITH DIABETIC NEUROPATHY, WITHOUT LONG-TERM CURRENT USE OF INSULIN (HCC): ICD-10-CM

## 2023-11-27 PROCEDURE — G0439 PPPS, SUBSEQ VISIT: HCPCS | Performed by: FAMILY MEDICINE

## 2023-11-27 PROCEDURE — 99214 OFFICE O/P EST MOD 30 MIN: CPT | Performed by: FAMILY MEDICINE

## 2023-11-27 PROCEDURE — 70100 X-RAY EXAM OF JAW <4VIEWS: CPT

## 2023-11-27 PROCEDURE — 3078F DIAST BP <80 MM HG: CPT | Performed by: FAMILY MEDICINE

## 2023-11-27 PROCEDURE — 3044F HG A1C LEVEL LT 7.0%: CPT | Performed by: FAMILY MEDICINE

## 2023-11-27 PROCEDURE — 1123F ACP DISCUSS/DSCN MKR DOCD: CPT | Performed by: FAMILY MEDICINE

## 2023-11-27 PROCEDURE — 3075F SYST BP GE 130 - 139MM HG: CPT | Performed by: FAMILY MEDICINE

## 2023-11-27 RX ORDER — GABAPENTIN 300 MG/1
300 CAPSULE ORAL 3 TIMES DAILY
Qty: 270 CAPSULE | Refills: 1 | Status: SHIPPED | OUTPATIENT
Start: 2023-11-27 | End: 2024-05-25

## 2023-11-27 RX ORDER — DEXTROAMPHETAMINE SACCHARATE, AMPHETAMINE ASPARTATE, DEXTROAMPHETAMINE SULFATE AND AMPHETAMINE SULFATE 7.5; 7.5; 7.5; 7.5 MG/1; MG/1; MG/1; MG/1
30 TABLET ORAL 2 TIMES DAILY
Qty: 60 TABLET | Refills: 0 | Status: SHIPPED | OUTPATIENT
Start: 2023-12-27 | End: 2024-01-26

## 2023-11-27 RX ORDER — DEXTROAMPHETAMINE SACCHARATE, AMPHETAMINE ASPARTATE, DEXTROAMPHETAMINE SULFATE AND AMPHETAMINE SULFATE 7.5; 7.5; 7.5; 7.5 MG/1; MG/1; MG/1; MG/1
30 TABLET ORAL 2 TIMES DAILY
Qty: 60 TABLET | Refills: 0 | Status: SHIPPED | OUTPATIENT
Start: 2024-02-25 | End: 2024-03-27

## 2023-11-27 RX ORDER — AMOXICILLIN 500 MG/1
500 CAPSULE ORAL 3 TIMES DAILY
Qty: 30 CAPSULE | Refills: 0 | Status: SHIPPED | OUTPATIENT
Start: 2023-11-27 | End: 2023-12-07

## 2023-11-27 RX ORDER — DEXTROAMPHETAMINE SACCHARATE, AMPHETAMINE ASPARTATE, DEXTROAMPHETAMINE SULFATE AND AMPHETAMINE SULFATE 7.5; 7.5; 7.5; 7.5 MG/1; MG/1; MG/1; MG/1
30 TABLET ORAL 2 TIMES DAILY
Qty: 60 TABLET | Refills: 0 | Status: SHIPPED | OUTPATIENT
Start: 2024-01-26 | End: 2024-02-25

## 2023-11-27 ASSESSMENT — PATIENT HEALTH QUESTIONNAIRE - PHQ9
2. FEELING DOWN, DEPRESSED OR HOPELESS: 0
SUM OF ALL RESPONSES TO PHQ QUESTIONS 1-9: 0
1. LITTLE INTEREST OR PLEASURE IN DOING THINGS: 0
SUM OF ALL RESPONSES TO PHQ QUESTIONS 1-9: 0
SUM OF ALL RESPONSES TO PHQ9 QUESTIONS 1 & 2: 0
SUM OF ALL RESPONSES TO PHQ QUESTIONS 1-9: 0
SUM OF ALL RESPONSES TO PHQ QUESTIONS 1-9: 0

## 2023-11-27 ASSESSMENT — LIFESTYLE VARIABLES: HOW MANY STANDARD DRINKS CONTAINING ALCOHOL DO YOU HAVE ON A TYPICAL DAY: PATIENT DOES NOT DRINK

## 2023-11-27 NOTE — PROGRESS NOTES
Barron Moran MD  [address] Robin Fang, [address] [facility]  Ph No:  [phone]  Fax:  [phone]:  Chief Complaint   Patient presents with    Medicare AWV     Pt in for 3 month follow up. Facial Injury     Pt in for 3 month follow up. Pt had a piece of wood hit him in his face about a week ago, but a knot on his chin has popped up. Doesn't really hurt,but has a nagging pain. HISTORY OF PRESENT ILLNESS:  Mr. Tenzin Pittman is a 76 y.o. male    Pt presents for 3 months follow up. Pt presents for Medicare wellness visit. Acutely, pt reports:    Jaw Pain:    Pt reports while sawing wood on a table saw, using rough cut lumber, the saw blade hit a nail, split the wood piece with 1/2 flying by head and the second half hitting him in the left jaw, which has produced swelling and a shift of jaw to left and upward. Pt is concerned about infection, jaw is painful with a nagging pain. Pt is given amoxil antibiotic coverage X 10 days for infection and sent for a 4 view xray of jaw and chin. Pt to see dentist for exam regarding teeth. Awaiting interpretative review by radiology. Type 2 Diabetes with diabetic neuropathy    Hgba1c 6.2%, improved diabetic control. Pt is using glimepiride 4mg po daily and metformin 1500mg in am and 1000mg in pm for good control. Trulicity 6.31QO/9.6ZN has been added for better control. Pt is to continue current therapy. Pt is to continue gabapentin 300mg tid po for neuropathic management. Hyperlipidemia:    Total cholesterol 84, hdl 44, ldl 27.4 ratio of cardiac risk very low 1.9.  pt is to continue lipitor 10mg po daily. Primary hypertension    /81, stable on losartan 50mg po daily, stable, continue current therapy. Adult ADHD    Pt is taking adderall 30 mg po bid daily with good results. Pt is given 3 months of medications. Pt will continue current therapy.     Iron deficient anemia:    Pt is taking ferrous

## 2023-12-12 DIAGNOSIS — F90.0 ATTENTION DEFICIT HYPERACTIVITY DISORDER (ADHD), PREDOMINANTLY INATTENTIVE TYPE: ICD-10-CM

## 2023-12-12 RX ORDER — DEXTROAMPHETAMINE SACCHARATE, AMPHETAMINE ASPARTATE, DEXTROAMPHETAMINE SULFATE AND AMPHETAMINE SULFATE 7.5; 7.5; 7.5; 7.5 MG/1; MG/1; MG/1; MG/1
30 TABLET ORAL 2 TIMES DAILY
Qty: 60 TABLET | Refills: 0 | Status: SHIPPED | OUTPATIENT
Start: 2023-12-27 | End: 2024-01-26

## 2023-12-13 DIAGNOSIS — F90.0 ATTENTION DEFICIT HYPERACTIVITY DISORDER (ADHD), PREDOMINANTLY INATTENTIVE TYPE: ICD-10-CM

## 2023-12-13 RX ORDER — DEXTROAMPHETAMINE SACCHARATE, AMPHETAMINE ASPARTATE, DEXTROAMPHETAMINE SULFATE AND AMPHETAMINE SULFATE 7.5; 7.5; 7.5; 7.5 MG/1; MG/1; MG/1; MG/1
30 TABLET ORAL 2 TIMES DAILY
Qty: 30 TABLET | Refills: 0 | Status: SHIPPED | OUTPATIENT
Start: 2023-12-13 | End: 2023-12-28

## 2023-12-13 NOTE — PROGRESS NOTES
Adderall sent for 15 days, until pt can receive next script, out of stock at initial pharmacy, sent to [facility].

## 2023-12-21 ENCOUNTER — TELEPHONE (OUTPATIENT)
Dept: UROLOGY | Age: 68
End: 2023-12-21

## 2023-12-27 ENCOUNTER — TELEPHONE (OUTPATIENT)
Dept: PRIMARY CARE CLINIC | Facility: CLINIC | Age: 68
End: 2023-12-27

## 2023-12-27 NOTE — TELEPHONE ENCOUNTER
----- Message from Annel Pereira sent at 12/20/2023  4:13 PM EST -----  Subject: Message to Provider    QUESTIONS  Information for Provider? Dave needs to have 8 stitches removed from the   back of his head that were put in on 12-18. They are to come out in 5-7   days from then. Please call to schedule an appt for him to come in for   that removal.  ---------------------------------------------------------------------------  --------------  CALL BACK INFO  [phone]; OK to leave message on voicemail  ---------------------------------------------------------------------------  --------------  SCRIPT ANSWERS  Relationship to Patient? Self  (Is the patient requesting to see the provider for a procedure?)? Yes

## 2023-12-29 DIAGNOSIS — F90.0 ATTENTION DEFICIT HYPERACTIVITY DISORDER (ADHD), PREDOMINANTLY INATTENTIVE TYPE: ICD-10-CM

## 2023-12-29 RX ORDER — DEXTROAMPHETAMINE SACCHARATE, AMPHETAMINE ASPARTATE, DEXTROAMPHETAMINE SULFATE AND AMPHETAMINE SULFATE 7.5; 7.5; 7.5; 7.5 MG/1; MG/1; MG/1; MG/1
30 TABLET ORAL 2 TIMES DAILY
Qty: 30 TABLET | Refills: 0 | Status: SHIPPED | OUTPATIENT
Start: 2023-12-29 | End: 2024-01-13

## 2023-12-29 NOTE — PROGRESS NOTES
Additional 30 days sent to pharmacy as error first script, only 30 tablets sent, should be 60 as twice daily dosing.

## 2023-12-29 NOTE — TELEPHONE ENCOUNTER
Spoke to patient and advised him to go to ER to get stitches out and not to wait until Dr Gagnon returns to office as the stitches are due to come out.       Patient also states that Adderall was sent to [facility] in [place] for December as a 30 day supply but he takes it BID. He picked up the 30 already. Will need another 30 tablets called in if possible.       Patient is aware PCP is out of office and will not be checking messages in timely manner

## 2024-01-23 ENCOUNTER — TELEPHONE (OUTPATIENT)
Dept: UROLOGY | Age: 69
End: 2024-01-23

## 2024-01-23 NOTE — TELEPHONE ENCOUNTER
----- Message from Breanne Person RN sent at 1/23/2024  8:23 AM EST -----  Regarding: FW: Surgery  Contact: [phone]  Pt wants to reschedule surgery  ----- Message -----  From: Dave Babb  Sent: 1/22/2024   6:50 PM EST  To: #  Subject: Surgery                                          I canceled my surgery that was to take place on Dec. 28 2023. I am now ready to have it done. Please reschedule  it.  Please let me know when.  Thanks  Dave Babb

## 2024-01-29 NOTE — TELEPHONE ENCOUNTER
Procedures: Procedure(s):   CIRCUMCISION   Date: 3/7/2024   Time: 1100   Location: [facility] MAIN OR 11

## 2024-01-30 ENCOUNTER — PREP FOR PROCEDURE (OUTPATIENT)
Dept: UROLOGY | Age: 69
End: 2024-01-30

## 2024-02-21 ENCOUNTER — TELEPHONE (OUTPATIENT)
Dept: PRIMARY CARE CLINIC | Facility: CLINIC | Age: 69
End: 2024-02-21

## 2024-02-21 NOTE — TELEPHONE ENCOUNTER
Called Dave to reschedule lab appt due to labtech leaving at 12 - he did not answer so I left a message for him to call back

## 2024-02-22 DIAGNOSIS — I10 PRIMARY HYPERTENSION: ICD-10-CM

## 2024-02-22 DIAGNOSIS — E11.40 TYPE 2 DIABETES MELLITUS WITH DIABETIC NEUROPATHY, WITHOUT LONG-TERM CURRENT USE OF INSULIN (HCC): ICD-10-CM

## 2024-02-22 DIAGNOSIS — R53.82 CHRONIC FATIGUE: ICD-10-CM

## 2024-02-22 DIAGNOSIS — E78.2 MIXED HYPERLIPIDEMIA: ICD-10-CM

## 2024-02-22 DIAGNOSIS — E55.9 VITAMIN D DEFICIENCY: ICD-10-CM

## 2024-02-22 LAB
BASOPHILS # BLD: 0 K/UL (ref 0–0.2)
BASOPHILS NFR BLD: 1 % (ref 0–2)
CREAT UR-MCNC: 146 MG/DL
DIFFERENTIAL METHOD BLD: ABNORMAL
EOSINOPHIL # BLD: 0 K/UL (ref 0–0.8)
EOSINOPHIL NFR BLD: 0 % (ref 0.5–7.8)
ERYTHROCYTE [DISTWIDTH] IN BLOOD BY AUTOMATED COUNT: 14.1 % (ref 11.9–14.6)
HCT VFR BLD AUTO: 44.3 % (ref 41.1–50.3)
HGB BLD-MCNC: 14.7 G/DL (ref 13.6–17.2)
IMM GRANULOCYTES # BLD AUTO: 0 K/UL (ref 0–0.5)
IMM GRANULOCYTES NFR BLD AUTO: 0 % (ref 0–5)
LYMPHOCYTES # BLD: 1.9 K/UL (ref 0.5–4.6)
LYMPHOCYTES NFR BLD: 24 % (ref 13–44)
MCH RBC QN AUTO: 27.8 PG (ref 26.1–32.9)
MCHC RBC AUTO-ENTMCNC: 33.2 G/DL (ref 31.4–35)
MCV RBC AUTO: 83.9 FL (ref 82–102)
MICROALBUMIN UR-MCNC: 2.64 MG/DL
MICROALBUMIN/CREAT UR-RTO: 18 MG/G (ref 0–30)
MONOCYTES # BLD: 0.5 K/UL (ref 0.1–1.3)
MONOCYTES NFR BLD: 6 % (ref 4–12)
NEUTS SEG # BLD: 5.5 K/UL (ref 1.7–8.2)
NEUTS SEG NFR BLD: 69 % (ref 43–78)
NRBC # BLD: 0 K/UL (ref 0–0.2)
PLATELET # BLD AUTO: 198 K/UL (ref 150–450)
PMV BLD AUTO: 11.3 FL (ref 9.4–12.3)
RBC # BLD AUTO: 5.28 M/UL (ref 4.23–5.6)
WBC # BLD AUTO: 7.9 K/UL (ref 4.3–11.1)

## 2024-02-23 LAB
25(OH)D3 SERPL-MCNC: 32.7 NG/ML (ref 30–100)
ALBUMIN SERPL-MCNC: 4.5 G/DL (ref 3.2–4.6)
ALBUMIN/GLOB SERPL: 1.4 (ref 0.4–1.6)
ALP SERPL-CCNC: 73 U/L (ref 50–136)
ALT SERPL-CCNC: 32 U/L (ref 12–65)
ANION GAP SERPL CALC-SCNC: 6 MMOL/L (ref 2–11)
AST SERPL-CCNC: 19 U/L (ref 15–37)
BILIRUB SERPL-MCNC: 0.9 MG/DL (ref 0.2–1.1)
BUN SERPL-MCNC: 18 MG/DL (ref 8–23)
CALCIUM SERPL-MCNC: 9.9 MG/DL (ref 8.3–10.4)
CHLORIDE SERPL-SCNC: 102 MMOL/L (ref 103–113)
CHOLEST SERPL-MCNC: 93 MG/DL
CO2 SERPL-SCNC: 28 MMOL/L (ref 21–32)
CREAT SERPL-MCNC: 1 MG/DL (ref 0.8–1.5)
EST. AVERAGE GLUCOSE BLD GHB EST-MCNC: 137 MG/DL
GLOBULIN SER CALC-MCNC: 3.2 G/DL (ref 2.8–4.5)
GLUCOSE SERPL-MCNC: 124 MG/DL (ref 65–100)
HBA1C MFR BLD: 6.4 % (ref 4.8–5.6)
HDLC SERPL-MCNC: 53 MG/DL (ref 40–60)
HDLC SERPL: 1.8
LDLC SERPL CALC-MCNC: 29.8 MG/DL
POTASSIUM SERPL-SCNC: 4.1 MMOL/L (ref 3.5–5.1)
PROT SERPL-MCNC: 7.7 G/DL (ref 6.3–8.2)
SODIUM SERPL-SCNC: 136 MMOL/L (ref 136–146)
T4 FREE SERPL-MCNC: 1 NG/DL (ref 0.78–1.46)
TRIGL SERPL-MCNC: 51 MG/DL (ref 35–150)
TSH, 3RD GENERATION: 1.71 UIU/ML (ref 0.36–3.74)
VLDLC SERPL CALC-MCNC: 10.2 MG/DL (ref 6–23)

## 2024-02-29 ENCOUNTER — OFFICE VISIT (OUTPATIENT)
Dept: PRIMARY CARE CLINIC | Facility: CLINIC | Age: 69
End: 2024-02-29
Payer: MEDICARE

## 2024-02-29 VITALS
TEMPERATURE: 98.1 F | WEIGHT: 184 LBS | SYSTOLIC BLOOD PRESSURE: 148 MMHG | BODY MASS INDEX: 27.98 KG/M2 | DIASTOLIC BLOOD PRESSURE: 79 MMHG | OXYGEN SATURATION: 98 % | HEART RATE: 78 BPM

## 2024-02-29 DIAGNOSIS — F90.0 ATTENTION DEFICIT HYPERACTIVITY DISORDER (ADHD), PREDOMINANTLY INATTENTIVE TYPE: ICD-10-CM

## 2024-02-29 DIAGNOSIS — E55.9 VITAMIN D DEFICIENCY: ICD-10-CM

## 2024-02-29 DIAGNOSIS — I10 PRIMARY HYPERTENSION: ICD-10-CM

## 2024-02-29 DIAGNOSIS — Z79.899 HIGH RISK MEDICATION USE: ICD-10-CM

## 2024-02-29 DIAGNOSIS — L03.818 CELLULITIS OF OTHER SPECIFIED SITE: ICD-10-CM

## 2024-02-29 DIAGNOSIS — E78.2 MIXED HYPERLIPIDEMIA: ICD-10-CM

## 2024-02-29 DIAGNOSIS — E11.40 TYPE 2 DIABETES MELLITUS WITH DIABETIC NEUROPATHY, WITHOUT LONG-TERM CURRENT USE OF INSULIN (HCC): Primary | ICD-10-CM

## 2024-02-29 PROBLEM — L03.90 CELLULITIS: Status: ACTIVE | Noted: 2024-02-29

## 2024-02-29 PROBLEM — B37.49 YEAST DERMATITIS OF PENIS: Status: RESOLVED | Noted: 2022-10-17 | Resolved: 2024-02-29

## 2024-02-29 PROCEDURE — 99214 OFFICE O/P EST MOD 30 MIN: CPT | Performed by: NURSE PRACTITIONER

## 2024-02-29 PROCEDURE — 3044F HG A1C LEVEL LT 7.0%: CPT | Performed by: NURSE PRACTITIONER

## 2024-02-29 PROCEDURE — 3078F DIAST BP <80 MM HG: CPT | Performed by: NURSE PRACTITIONER

## 2024-02-29 PROCEDURE — 3077F SYST BP >= 140 MM HG: CPT | Performed by: NURSE PRACTITIONER

## 2024-02-29 PROCEDURE — 1123F ACP DISCUSS/DSCN MKR DOCD: CPT | Performed by: NURSE PRACTITIONER

## 2024-02-29 RX ORDER — GABAPENTIN 300 MG/1
300 CAPSULE ORAL 3 TIMES DAILY
Qty: 270 CAPSULE | Refills: 1 | Status: SHIPPED | OUTPATIENT
Start: 2024-02-29 | End: 2024-08-27

## 2024-02-29 RX ORDER — ERGOCALCIFEROL 1.25 MG/1
50000 CAPSULE ORAL WEEKLY
Qty: 12 CAPSULE | Refills: 1 | Status: SHIPPED | OUTPATIENT
Start: 2024-02-29

## 2024-02-29 RX ORDER — ATORVASTATIN CALCIUM 10 MG/1
10 TABLET, FILM COATED ORAL DAILY
Qty: 90 TABLET | Refills: 1 | Status: SHIPPED | OUTPATIENT
Start: 2024-02-29

## 2024-02-29 RX ORDER — SULFAMETHOXAZOLE AND TRIMETHOPRIM 800; 160 MG/1; MG/1
1 TABLET ORAL 2 TIMES DAILY
Qty: 20 TABLET | Refills: 0 | Status: SHIPPED | OUTPATIENT
Start: 2024-02-29 | End: 2024-03-10

## 2024-02-29 RX ORDER — GLIMEPIRIDE 4 MG/1
4 TABLET ORAL
Qty: 90 TABLET | Refills: 1 | Status: SHIPPED | OUTPATIENT
Start: 2024-02-29

## 2024-02-29 RX ORDER — DEXTROAMPHETAMINE SACCHARATE, AMPHETAMINE ASPARTATE, DEXTROAMPHETAMINE SULFATE AND AMPHETAMINE SULFATE 7.5; 7.5; 7.5; 7.5 MG/1; MG/1; MG/1; MG/1
30 TABLET ORAL 2 TIMES DAILY
Qty: 60 TABLET | Refills: 0 | Status: SHIPPED | OUTPATIENT
Start: 2024-02-29 | End: 2024-03-30

## 2024-02-29 RX ORDER — LOSARTAN POTASSIUM 50 MG/1
50 TABLET ORAL DAILY
Qty: 90 TABLET | Refills: 1 | Status: SHIPPED | OUTPATIENT
Start: 2024-02-29

## 2024-02-29 ASSESSMENT — PATIENT HEALTH QUESTIONNAIRE - PHQ9
SUM OF ALL RESPONSES TO PHQ9 QUESTIONS 1 & 2: 0
SUM OF ALL RESPONSES TO PHQ QUESTIONS 1-9: 0
1. LITTLE INTEREST OR PLEASURE IN DOING THINGS: 0
2. FEELING DOWN, DEPRESSED OR HOPELESS: 0
SUM OF ALL RESPONSES TO PHQ QUESTIONS 1-9: 0

## 2024-02-29 ASSESSMENT — ENCOUNTER SYMPTOMS
ABDOMINAL PAIN: 0
ABDOMINAL DISTENTION: 0
ALLERGIC/IMMUNOLOGIC NEGATIVE: 1
EYES NEGATIVE: 1
RESPIRATORY NEGATIVE: 1

## 2024-02-29 NOTE — ASSESSMENT & PLAN NOTE
DM Type II  Chronic. Stable/Well-Controlled  Labs: A1C (as indicated) with annual labs. Recent labs reviewed.  Medication:  Given refill for Glim epride and Trulicity.  Medication and dosage is unchanged today.  Discussed warning S&S r/t medication usage. Discussed SE, AR, AE.  Tolerating medication well. No SE, AR, AE. Benefits outweigh risks. Prefers to continue medication as currently prescribed  Encourage appropriate rest and fluid intake  Encourage lifestyle changes, including healthy eating, exercise, limiting alcohol/tobacco use, and stress reducers.  Monitor BP and HR at home. Keep a log and bring to each visit.  Monitor BGL readings at home as directed (4x daily). Keep a log and bring to each visit.  F/u in 6 months for re-evaluation, unless an earlier f/u is required    General Measures:  Recommended diabetic foot exam at every visit.  Recommended annual diabetic eye exam  Monitor for control of cardiovascular risk factors including BP and lipids  Provided diabetes self-management education    Monofilament:Performed today. Sensory exam of the foot is STABLE, tested with the monofilament. Good pulses, no lesions or ulcers, good peripheral pulses.    No components found for: \"HGBA1C\"   Lab Results   Component Value Date    LDLCALC 29.8 02/22/2024    CREATININE 1.00 02/22/2024       Diabetes (Patient Education)    Weight loss through dietary modification can improve many aspects of type 2 diabetes including glycemic control and hypertension. The improvement in glycemic control is related to both the degree caloric restriction and weight reduction. 150 minutes of intense aerobic exercise per week is shown to assist in diabetes management. Management of diabetes is good for long term health. Diabetes if shown to set one up for cardiac disease, high blood pressure, high cholesterol, and erectile dysfunction in men. Low fat diet is shown to assist in control. Patient advised to bring glucose log to next visit.

## 2024-02-29 NOTE — ASSESSMENT & PLAN NOTE
Hyperlipidemia    Chronic. Stable/Well-Controlled.  Labs: at least annually. Recent labs reviewed.  Non-laboratory testing: None indicated today.  Medication: Given Rx for Atorvastatin 10 mg PO OD.  Medication and dosage is unchanged today.  Discussed warning S&S r/t medication usage. Discussed SE, AR, AE.  F/u in 6 months for re-evaluation, unless an earlier f/u is required. If improvement is noted, we will provide additional refills.    Diet/Exercise/Lifestyle Changes  Encourage appropriate rest and fluid intake  Encourage lifestyle changes, including healthy eating, exercise, limiting alcohol/tobacco use, and stress reducers.  Dietary Considerations  Eat more grains (substitute quinoa or brown rice instead of potatoes or pasta)  Eat more nuts (unsalted)  Eat more omega 3 fats (2 servings per week)  [place], mackerel, herring, tuna, flax, walnuts, spinach, broccoli, edamame, omega supplements  Eat more applies, pears, oranges, and mir  Use olive oil when possible  Olive oil is not good for high heat applications like frying  Try using canola oil with high heat applications  Can be mixed with red wine vinegar, minced garlic, and ground pepper to make salad dressing  Drizzle 3 tbsp olive oil over vegetables (carrots, leeks). Scatter on some herbs, cover with foil. Bake at 375 for 45 minutes.  Try different foods  Avocado  Tofu  Activity Changes:  Exercise - try to get at least 15 minutes of dedicated activity every day  Eat smaller portions  Things to limit or take out of your diet:  Avoid soda, baked goods, candies, cookies as much as possible (these have high sugar content)  Avoid french fries, crackers, cakes, chips as much as possible (these have high trans fat content)  Limit eggs (4 per week)  Limit saturated fats (meat, dairy, eggs)

## 2024-02-29 NOTE — ASSESSMENT & PLAN NOTE
Vitamin D Deficiency    Stable/Well-Controlled  Denies symptoms    Patient Education:  Common risk factors for vitamin D deficiency include inadequate sun exposure or dietary intake; obesity; and black or  ethnicity and obesity.  Educated on interactions with other medications  Stressed the importance of compliance with supplemental therapy  Explained the need for lifelong treatment  Encouraged patient to report any new symptoms or a change in symptoms  Encourage proper diet, exercise, lifestyle changes  Medication Usage:  ?  Vitamin D sufficient (25-OH vitamin D 20 ng/ml)  Vitamin D 800 to 2,000 IU/day D-   (animal derived) may be slightly more effective than DI (plant derived), but clinical significance is uncertain.  ?  Calcium supplementation: unclear benefit and may increase some congenital heart disease risk in patients; no supplementation currently required  Vitamin D deficiency (25-OH vitamin D <20 ng/ml)  50,000 IU/week for 8 to 12 weeks, followed by 800 to 2,000 IU/day of vitamin DI    Labs:  Evaluated at least annually  Repeat if new or change in symptoms   Discussed labs today. No new labs indicated    Lab Results   Component Value Date/Time    VITD25 32.7 02/22/2024 02:32 PM    VITD25 48.5 11/22/2023 02:20 PM    VITD25 29.3 (L) 08/30/2023 02:35 PM

## 2024-02-29 NOTE — PROGRESS NOTES
[facility] PHYSICIAN SERVICES  [facility] PRIMARY CARE  [address] [place] [address] [address] [address]  Dept: [phone]       Patient: Dave Babb  YOB: 1955  Patient Age: 68 y.o.  Patient Sex: male  Medical Record: [id number]  Visit Date: 02/29/2024    Family Practice Clinic Note    Chief Complaint   Patient presents with    Discuss Labs    Skin Problem    Medication Refill       1. Presents today for routine follow up/ discuss labs  2. Skin problem- concerned about skin lesions on back.   3. ADHD- follow up, refills Adderall 30mg twice a day  4. Diabetes- Follow up,refills. Has neuropathy, taking gapabentin. Foot exam due   5.Hyperlipidemia- atorvastatin 10mg daily. Refills   5.HTN- losartan 50mg daily      Skin Problem  This is a new problem. The current episode started 1 to 4 weeks ago. The problem has been gradually worsening since onset. The affected locations include the left shoulder. The rash is characterized by pain and redness. He was exposed to nothing. Pertinent negatives include no congestion, fatigue or fever. Past treatments include nothing.   ADHD  This is a chronic problem. The current episode started more than 1 year ago. The problem has been unchanged. Pertinent negatives include no abdominal pain, chest pain, chills, congestion, fatigue, fever, headaches or numbness.   Diabetes  He presents for his follow-up diabetic visit. He has type 2 diabetes mellitus. His disease course has been stable. Pertinent negatives for hypoglycemia include no dizziness or headaches. Pertinent negatives for diabetes include no chest pain and no fatigue. There are no hypoglycemic complications. Diabetic complications include peripheral neuropathy. Risk factors for coronary artery disease include diabetes mellitus, male sex and hypertension. Current diabetic treatment includes oral agent (monotherapy) (Trulicity). He is compliant with treatment all of the time.   Hyperlipidemia  This is a

## 2024-02-29 NOTE — ASSESSMENT & PLAN NOTE
HTN  Chronic. Stable/Well-Controlled  Labs: at least annually. Recent labs reviewed.  Non-laboratory testing: ECG (as indicated, not performed today)  Medication:  Given refill for losartan  Medication and dosage is unchanged today.  Discussed warning S&S r/t medication usage. Discussed SE, AR, AE.  Tolerating medication well. No SE, AR, AE. Benefits outweigh risks. Prefers to continue medication as currently prescribed.  Encourage appropriate rest and fluid intake  Encourage lifestyle changes, including healthy eating, exercise, limiting alcohol/tobacco use, and stress reducers. DASH diet. Reduce sodium intake. Limit alcohol consumption to < 1 oz/day.  Monitor BP and HR at home. Keep a log and bring to each visit.  F/u in 6 months for re-evaluation, unless an earlier f/u is required. If improvement is noted, we will provide additional refills.

## 2024-03-01 LAB
AMPHETAMINES UR QL SCN: NEGATIVE NG/ML
BARBITURATES UR QL SCN: NEGATIVE NG/ML
BENZODIAZ UR QL SCN: NEGATIVE NG/ML
BZE UR QL SCN: NEGATIVE NG/ML
CANNABINOIDS UR QL SCN: NEGATIVE NG/ML
CREAT UR-MCNC: 14.8 MG/DL (ref 20–300)
FENTANYL+NORFENTANYL UR QL SCN: NEGATIVE PG/ML
LABORATORY COMMENT REPORT: ABNORMAL
MEPERIDINE UR QL: NEGATIVE NG/ML
METHADONE UR QL SCN: NEGATIVE NG/ML
OPIATES UR QL SCN: NEGATIVE NG/ML
OXYCODONE+OXYMORPHONE UR QL SCN: NEGATIVE NG/ML
PCP UR QL: NEGATIVE NG/ML
PH UR: 5.5 (ref 4.5–8.9)
PROPOXYPH UR QL SCN: NEGATIVE NG/ML
SP GR UR: 1
TRAMADOL UR QL SCN: NEGATIVE NG/ML

## 2024-03-03 LAB
BACTERIA SPEC CULT: ABNORMAL
BACTERIA SPEC CULT: ABNORMAL
GRAM STN SPEC: ABNORMAL
GRAM STN SPEC: ABNORMAL
SERVICE CMNT-IMP: ABNORMAL

## 2024-03-04 NOTE — RESULT ENCOUNTER NOTE
Please let the patient know:    The recent wound culture is positive for staph bacteria. The antibiotic Bactrim does cover this bacteria.    CH4e message sent as well    Explanatory note: Be assured that the information provided to create your medical record comes from your provider. The written transcription portion of this note is prepared electronically by voice-recognition software. At times, there may be some errors in capitalization, punctuation, tense, or context that are inherent in the system, but your provider reviews the note for content and to ensure that the note contains appropriate information for your continuing care.

## 2024-03-05 ENCOUNTER — TELEPHONE (OUTPATIENT)
Dept: UROLOGY | Age: 69
End: 2024-03-05

## 2024-03-05 NOTE — PROGRESS NOTES
PLEASE CONTINUE TAKING ALL PRESCRIPTION MEDICATIONS UP TO THE DAY OF SURGERY UNLESS OTHERWISE DIRECTED BELOW. You may take Tylenol, allergy,  and/or indigestion medications.     TAKE ONLY THESE MEDICATIONS ON THE DAY OF SURGERY    GABAPENTIN           DISCONTINUE all vitamins and supplements 7 days prior to surgery. DISCONTINUE Non-Steroidal Anti-Inflammatory (NSAIDS) such as Advil and Aleve 5 days prior to surgery.     Home Medications to Hold- please continue all other medications except these.    ANAPROX    TRULICITY WEEKLY-- LAST DOSE 3/3/24      Comments      On the day before surgery please take 2 Tylenol in the morning and then again before bed. You may use either regular or extra strength.           Please do not bring home medications with you on the day of surgery unless otherwise directed by your nurse.  If you are instructed to bring home medications, please give them to your nurse as they will be administered by the nursing staff.    If you have any questions, please call [facility] [phone].    A copy of this note was provided to the patient for reference.

## 2024-03-05 NOTE — PROGRESS NOTES
Patient verified name and     Order for consent WAS found in EHR and matches case posting; patient verified.     Type 1B surgery, PHONE assessment complete.    Labs per surgeon: NONE  Labs per anesthesia protocol: SQBS DOS  EKG: NONE    PT TAKES TRULICITY WEEKLY ON SUNDAYS-- LAST DOSE 3/3/24 SURG 3/7/24. PT STATES NO ONE MENTION HIM TO HOLD THIS MED. SPOKE TO DR DARLING STATES PROCEDURE NEEDS TO BE POSTPONED TIL PT OFF MED X 1 WEEK. CALLED OFFICE LEFT MESSAGE FOR AARON TO CALL PT. ALSO I CALLED PT AND MADE HIM AWARE OF DR BRADFORD DECISION AND PT TO CALL OFFICE IF HE HASN'T HEARD FROM THEM BY THIS AFTERNOON    Hospital approved surgical skin cleanser and instructions given per hospital policy.    Patient provided with and instructed on educational handouts including Guide to Surgery, Pain Management, Hand Hygiene, Blood Transfusion Education, and [facility] Brochure.    Patient answered medical/surgical history questions at their best of ability. All prior to admission medications documented in [facility]. Original medication prescription bottle 7 visualized during patient appointment.     Patient instructed to hold all vitamins 7 days prior to surgery and NSAIDS 5 days prior to surgery, patient verbalized understanding.     Patient teach back successful and patient demonstrates knowledge of instructions.

## 2024-03-05 NOTE — TELEPHONE ENCOUNTER
Nessa called from Pre assessment stating patient needs to be rescheduled from 3/7.  I guess he takes Tulicity and stopped it Sunday and needs to be off at least a week.  I have not heard of this in the past so did not tell him to stop.  Please advise

## 2024-03-06 LAB
BACTERIA SPEC CULT: NORMAL
SERVICE CMNT-IMP: NORMAL

## 2024-03-22 LAB
BACTERIA SPEC CULT: NORMAL
SERVICE CMNT-IMP: NORMAL

## 2024-03-27 DIAGNOSIS — E11.40 TYPE 2 DIABETES MELLITUS WITH DIABETIC NEUROPATHY, WITHOUT LONG-TERM CURRENT USE OF INSULIN (HCC): ICD-10-CM

## 2024-03-27 DIAGNOSIS — I10 PRIMARY HYPERTENSION: ICD-10-CM

## 2024-03-27 RX ORDER — LOSARTAN POTASSIUM 50 MG/1
50 TABLET ORAL DAILY
Qty: 90 TABLET | Refills: 1 | OUTPATIENT
Start: 2024-03-27

## 2024-03-27 RX ORDER — GLIMEPIRIDE 4 MG/1
4 TABLET ORAL
Qty: 90 TABLET | Refills: 1 | OUTPATIENT
Start: 2024-03-27

## 2024-04-01 ENCOUNTER — TELEPHONE (OUTPATIENT)
Dept: PRIMARY CARE CLINIC | Facility: CLINIC | Age: 69
End: 2024-04-01

## 2024-04-01 ENCOUNTER — TELEMEDICINE (OUTPATIENT)
Dept: PRIMARY CARE CLINIC | Facility: CLINIC | Age: 69
End: 2024-04-01
Payer: MEDICARE

## 2024-04-01 DIAGNOSIS — F90.0 ATTENTION DEFICIT HYPERACTIVITY DISORDER (ADHD), PREDOMINANTLY INATTENTIVE TYPE: ICD-10-CM

## 2024-04-01 PROCEDURE — 99213 OFFICE O/P EST LOW 20 MIN: CPT | Performed by: NURSE PRACTITIONER

## 2024-04-01 PROCEDURE — 1123F ACP DISCUSS/DSCN MKR DOCD: CPT | Performed by: NURSE PRACTITIONER

## 2024-04-01 RX ORDER — DEXTROAMPHETAMINE SACCHARATE, AMPHETAMINE ASPARTATE, DEXTROAMPHETAMINE SULFATE AND AMPHETAMINE SULFATE 7.5; 7.5; 7.5; 7.5 MG/1; MG/1; MG/1; MG/1
30 TABLET ORAL 2 TIMES DAILY
Qty: 60 TABLET | Refills: 0 | Status: SHIPPED | OUTPATIENT
Start: 2024-04-01 | End: 2024-05-01

## 2024-04-01 RX ORDER — DEXTROAMPHETAMINE SACCHARATE, AMPHETAMINE ASPARTATE, DEXTROAMPHETAMINE SULFATE AND AMPHETAMINE SULFATE 7.5; 7.5; 7.5; 7.5 MG/1; MG/1; MG/1; MG/1
30 TABLET ORAL 2 TIMES DAILY
Qty: 60 TABLET | Refills: 0 | Status: SHIPPED | OUTPATIENT
Start: 2024-06-01 | End: 2024-07-01

## 2024-04-01 RX ORDER — DEXTROAMPHETAMINE SACCHARATE, AMPHETAMINE ASPARTATE, DEXTROAMPHETAMINE SULFATE AND AMPHETAMINE SULFATE 7.5; 7.5; 7.5; 7.5 MG/1; MG/1; MG/1; MG/1
30 TABLET ORAL 2 TIMES DAILY
Qty: 60 TABLET | Refills: 0 | Status: SHIPPED | OUTPATIENT
Start: 2024-05-01 | End: 2024-05-31

## 2024-04-01 ASSESSMENT — ENCOUNTER SYMPTOMS
ABDOMINAL PAIN: 0
EYES NEGATIVE: 1
VOMITING: 0
SWOLLEN GLANDS: 0
RESPIRATORY NEGATIVE: 1
COUGH: 0
ABDOMINAL DISTENTION: 0
ALLERGIC/IMMUNOLOGIC NEGATIVE: 1
NAUSEA: 0
SORE THROAT: 0

## 2024-04-01 NOTE — PROGRESS NOTES
.  
[email or web address]   Phone:  [phone]  Drug brands, if listed herein, are trademarks of their respective  owners.  Performed At:  [facility], located within [facility]  [address] [place], [place] [id number]  Kulwant Curtis PhD Ph:[phone]      Special Requests 02/29/2024 NO SPECIAL REQUESTS    Final    Gram Stain 02/29/2024 NO WBCS SEEN    Final    Gram Stain 02/29/2024 NO DEFINITE ORGANISM SEEN    Final    Culture 02/29/2024 LIGHT Staphylococcus aureus (A)    Final    Culture 02/29/2024 SCANT NORMAL SKIN DEYSI ISOLATED    Final    Special Requests 02/29/2024 NO SPECIAL REQUESTS    Final    Culture 02/29/2024 NO ANAEROBIC GROWTH IN 21 DAYS    Final       Educational documents were provided including; but, not limited to diagnosis, prognosis, recurrent, complications, monitoring, instructions, prevention, etc.    Patient aware of all medications (prescribed and recommended). Patient verbalized understanding. Patient declined all other medications (OTC, provided by clinic and prescribed) as well as additional testing/imaging/diagnostics at this time. Patient aware of risks associated with declining treatment/recommendations and/or non-compliance with plan of care.    *Side effects, adverse effects, risks versus benefits associated with medications prescribed/recommended were discussed with the patient. Patient verbalized understanding. All questions answered.    *Patient was encouraged to return to the clinic and/or PCP. Or seek emergent care if worsening signs and symptoms warrant immediate evaluation including, but not limited to HA, blurred vision, facial asymmetry, speech disturbance, difficulty with ambulation/gait, numbness, tingling, weakness, syncope, chest pain (with or without radiation), left arm pain, jaw pain, changes in hearing (loss), fever, unexplained sweating, malaise/fatigue, difficulty swallowing, mental changes (confusion, AMS), lightheadedness/dizziness, difficulty breathing, or shortness of

## 2024-04-01 NOTE — TELEPHONE ENCOUNTER
Called pt to schedule follow up, left vm.    Return in about 3 months (around 7/1/2024) for for CS Med Refill.

## 2024-04-01 NOTE — ASSESSMENT & PLAN NOTE
CS Use  Chronic.  Stable..  CSA: UTD.  UDS: UTD.  Medication:  Given refill for Adderall  Discussed warning S&S r/t medication usage. Discussed SE, AR, AE.  F/u in 3 months for re-evaluation, unless an earlier f/u is required. If improvement is noted, we will provide additional refills.    Per Dr Gagnon, referral to specialist needed for continued therapy  Referral: Pending from previous visit.

## 2024-04-16 NOTE — PERIOP NOTE
Patient verified name and .  Order for consent  found in EHR and matches case posting; patient verifies procedure.   Type 1B surgery, phone assessment complete.  Orders  received.  Labs per surgeon: UA- pre-op  Labs per anesthesia protocol: SQBS s/h for DOS    Patient answered medical/surgical history questions at their best of ability. All prior to admission medications documented in EPIC.    Patient instructed to continue taking all prescription medications up to the day of surgery but to take only the following medications the day of surgery according to anesthesia guidelines with a small sip of water: gabapentin, atorvastatin, adderall if needed.    Also, patient is requested to take 2 Tylenol in the morning and then again before bed on the day before surgery. Regular or extra strength may be used.       Patient informed that all vitamins and supplements should be held 7 days prior to surgery and NSAIDS 5 days prior to surgery. Prescription meds to hold: naproxen- hold now for surgery.  Hold Trulicity now for surgery- patient states last dose of Trulicity was 24    Patient instructed on the following:  > Patient is instructed to have clear liquids only on the day prior to procedure and to be NPO after midnight, unless otherwise indicated, including gum, mints, and ice chips.   > Arrive at main Entrance, time of arrival to be called the day before by 1700  > NPO after midnight, unless otherwise indicated, including gum, mints, and ice chips  > Responsible adult must drive patient to the hospital, stay during surgery, and patient will need supervision 24 hours after anesthesia  > Use non moisturizing soap in shower the night before surgery and on the morning of surgery  > All piercings must be removed prior to arrival.    > Leave all valuables (money and jewelry) at home but bring insurance card and ID on DOS.   > You may be required to pay a deductible or co-pay on the day of your procedure. You can

## 2024-04-17 ENCOUNTER — ANESTHESIA EVENT (OUTPATIENT)
Dept: SURGERY | Age: 69
End: 2024-04-17
Payer: MEDICARE

## 2024-04-17 NOTE — ANESTHESIA PRE PROCEDURE
Applicable): No results found for: \"COVID19\"        Anesthesia Evaluation  Patient summary reviewed  Airway: Mallampati: II          Dental: normal exam         Pulmonary:Negative Pulmonary ROS breath sounds clear to auscultation                             Cardiovascular:  Exercise tolerance: good (>4 METS)  (+) hypertension:, hyperlipidemia    (-) past MI, murmur and peripheral edema      Rhythm: regular  Rate: normal                    Neuro/Psych:   Negative Neuro/Psych ROS     (-) CVA           GI/Hepatic/Renal: Neg GI/Hepatic/Renal ROS            Endo/Other: Negative Endo/Other ROS   (+) Diabetes.                 Abdominal:             Vascular: negative vascular ROS.         Other Findings:       Anesthesia Plan      general     ASA 2     (LMA)  Induction: intravenous.      Anesthetic plan and risks discussed with patient.                    Sunil Gambino MD   4/17/2024

## 2024-04-18 ENCOUNTER — ANESTHESIA (OUTPATIENT)
Dept: SURGERY | Age: 69
End: 2024-04-18
Payer: MEDICARE

## 2024-04-18 ENCOUNTER — TELEPHONE (OUTPATIENT)
Dept: UROLOGY | Age: 69
End: 2024-04-18

## 2024-04-18 ENCOUNTER — HOSPITAL ENCOUNTER (OUTPATIENT)
Age: 69
Setting detail: OUTPATIENT SURGERY
Discharge: HOME | End: 2024-04-18
Attending: UROLOGY | Admitting: UROLOGY
Payer: MEDICARE

## 2024-04-18 VITALS
HEART RATE: 71 BPM | WEIGHT: 173 LBS | BODY MASS INDEX: 25.62 KG/M2 | OXYGEN SATURATION: 97 % | DIASTOLIC BLOOD PRESSURE: 73 MMHG | SYSTOLIC BLOOD PRESSURE: 132 MMHG | RESPIRATION RATE: 16 BRPM | TEMPERATURE: 97.3 F | HEIGHT: 69 IN

## 2024-04-18 DIAGNOSIS — N47.1 PHIMOSIS: Primary | ICD-10-CM

## 2024-04-18 PROBLEM — N47.5 PENILE ADHESIONS: Status: ACTIVE | Noted: 2024-04-18

## 2024-04-18 LAB
GLUCOSE BLD STRIP.AUTO-MCNC: 138 MG/DL (ref 65–100)
SERVICE CMNT-IMP: ABNORMAL

## 2024-04-18 PROCEDURE — 7100000001 HC PACU RECOVERY - ADDTL 15 MIN: Performed by: UROLOGY

## 2024-04-18 PROCEDURE — 6360000002 HC RX W HCPCS: Performed by: NURSE ANESTHETIST, CERTIFIED REGISTERED

## 2024-04-18 PROCEDURE — 6370000000 HC RX 637 (ALT 250 FOR IP): Performed by: UROLOGY

## 2024-04-18 PROCEDURE — 7100000011 HC PHASE II RECOVERY - ADDTL 15 MIN: Performed by: UROLOGY

## 2024-04-18 PROCEDURE — 2500000003 HC RX 250 WO HCPCS: Performed by: NURSE ANESTHETIST, CERTIFIED REGISTERED

## 2024-04-18 PROCEDURE — 7100000010 HC PHASE II RECOVERY - FIRST 15 MIN: Performed by: UROLOGY

## 2024-04-18 PROCEDURE — 6360000002 HC RX W HCPCS: Performed by: UROLOGY

## 2024-04-18 PROCEDURE — 6370000000 HC RX 637 (ALT 250 FOR IP): Performed by: ANESTHESIOLOGY

## 2024-04-18 PROCEDURE — 3600000012 HC SURGERY LEVEL 2 ADDTL 15MIN: Performed by: UROLOGY

## 2024-04-18 PROCEDURE — 54161 CIRCUM 28 DAYS OR OLDER: CPT | Performed by: UROLOGY

## 2024-04-18 PROCEDURE — 82962 GLUCOSE BLOOD TEST: CPT

## 2024-04-18 PROCEDURE — 54162 LYSIS PENIL CIRCUMIC LESION: CPT | Performed by: UROLOGY

## 2024-04-18 PROCEDURE — 2709999900 HC NON-CHARGEABLE SUPPLY: Performed by: UROLOGY

## 2024-04-18 PROCEDURE — 3600000002 HC SURGERY LEVEL 2 BASE: Performed by: UROLOGY

## 2024-04-18 PROCEDURE — 3700000001 HC ADD 15 MINUTES (ANESTHESIA): Performed by: UROLOGY

## 2024-04-18 PROCEDURE — 3700000000 HC ANESTHESIA ATTENDED CARE: Performed by: UROLOGY

## 2024-04-18 PROCEDURE — 2580000003 HC RX 258: Performed by: ANESTHESIOLOGY

## 2024-04-18 PROCEDURE — 7100000000 HC PACU RECOVERY - FIRST 15 MIN: Performed by: UROLOGY

## 2024-04-18 RX ORDER — IPRATROPIUM BROMIDE AND ALBUTEROL SULFATE 2.5; .5 MG/3ML; MG/3ML
1 SOLUTION RESPIRATORY (INHALATION)
Status: DISCONTINUED | OUTPATIENT
Start: 2024-04-18 | End: 2024-04-18 | Stop reason: HOSPADM

## 2024-04-18 RX ORDER — ONDANSETRON 2 MG/ML
INJECTION INTRAMUSCULAR; INTRAVENOUS PRN
Status: DISCONTINUED | OUTPATIENT
Start: 2024-04-18 | End: 2024-04-18 | Stop reason: SDUPTHER

## 2024-04-18 RX ORDER — SODIUM CHLORIDE 0.9 % (FLUSH) 0.9 %
5-40 SYRINGE (ML) INJECTION PRN
Status: DISCONTINUED | OUTPATIENT
Start: 2024-04-18 | End: 2024-04-18 | Stop reason: HOSPADM

## 2024-04-18 RX ORDER — BUPIVACAINE HYDROCHLORIDE 2.5 MG/ML
INJECTION, SOLUTION EPIDURAL; INFILTRATION; INTRACAUDAL PRN
Status: DISCONTINUED | OUTPATIENT
Start: 2024-04-18 | End: 2024-04-18 | Stop reason: ALTCHOICE

## 2024-04-18 RX ORDER — LIDOCAINE HYDROCHLORIDE 10 MG/ML
1 INJECTION, SOLUTION INFILTRATION; PERINEURAL
Status: DISCONTINUED | OUTPATIENT
Start: 2024-04-18 | End: 2024-04-18 | Stop reason: HOSPADM

## 2024-04-18 RX ORDER — HYDROCODONE BITARTRATE AND ACETAMINOPHEN 5; 325 MG/1; MG/1
1 TABLET ORAL EVERY 6 HOURS PRN
Qty: 10 TABLET | Refills: 0 | Status: SHIPPED | OUTPATIENT
Start: 2024-04-18 | End: 2024-04-21

## 2024-04-18 RX ORDER — MIDAZOLAM HYDROCHLORIDE 1 MG/ML
INJECTION INTRAMUSCULAR; INTRAVENOUS PRN
Status: DISCONTINUED | OUTPATIENT
Start: 2024-04-18 | End: 2024-04-18 | Stop reason: SDUPTHER

## 2024-04-18 RX ORDER — FENTANYL CITRATE 50 UG/ML
INJECTION, SOLUTION INTRAMUSCULAR; INTRAVENOUS PRN
Status: DISCONTINUED | OUTPATIENT
Start: 2024-04-18 | End: 2024-04-18 | Stop reason: SDUPTHER

## 2024-04-18 RX ORDER — EPHEDRINE SULFATE 5 MG/ML
INJECTION INTRAVENOUS PRN
Status: DISCONTINUED | OUTPATIENT
Start: 2024-04-18 | End: 2024-04-18 | Stop reason: SDUPTHER

## 2024-04-18 RX ORDER — HALOPERIDOL 5 MG/ML
1 INJECTION INTRAMUSCULAR
Status: DISCONTINUED | OUTPATIENT
Start: 2024-04-18 | End: 2024-04-18 | Stop reason: HOSPADM

## 2024-04-18 RX ORDER — IBUPROFEN 200 MG
TABLET ORAL PRN
Status: DISCONTINUED | OUTPATIENT
Start: 2024-04-18 | End: 2024-04-18 | Stop reason: ALTCHOICE

## 2024-04-18 RX ORDER — NALOXONE HYDROCHLORIDE 0.4 MG/ML
INJECTION, SOLUTION INTRAMUSCULAR; INTRAVENOUS; SUBCUTANEOUS PRN
Status: DISCONTINUED | OUTPATIENT
Start: 2024-04-18 | End: 2024-04-18 | Stop reason: HOSPADM

## 2024-04-18 RX ORDER — ACETAMINOPHEN 500 MG
1000 TABLET ORAL ONCE
Status: COMPLETED | OUTPATIENT
Start: 2024-04-18 | End: 2024-04-18

## 2024-04-18 RX ORDER — ONDANSETRON 2 MG/ML
4 INJECTION INTRAMUSCULAR; INTRAVENOUS
Status: DISCONTINUED | OUTPATIENT
Start: 2024-04-18 | End: 2024-04-18 | Stop reason: HOSPADM

## 2024-04-18 RX ORDER — DEXAMETHASONE SODIUM PHOSPHATE 4 MG/ML
INJECTION, SOLUTION INTRA-ARTICULAR; INTRALESIONAL; INTRAMUSCULAR; INTRAVENOUS; SOFT TISSUE PRN
Status: DISCONTINUED | OUTPATIENT
Start: 2024-04-18 | End: 2024-04-18 | Stop reason: SDUPTHER

## 2024-04-18 RX ORDER — SODIUM CHLORIDE 0.9 % (FLUSH) 0.9 %
5-40 SYRINGE (ML) INJECTION EVERY 12 HOURS SCHEDULED
Status: DISCONTINUED | OUTPATIENT
Start: 2024-04-18 | End: 2024-04-18 | Stop reason: HOSPADM

## 2024-04-18 RX ORDER — PHENYLEPHRINE HYDROCHLORIDE 10 MG/ML
INJECTION INTRAVENOUS PRN
Status: DISCONTINUED | OUTPATIENT
Start: 2024-04-18 | End: 2024-04-18 | Stop reason: SDUPTHER

## 2024-04-18 RX ORDER — DEXMEDETOMIDINE HYDROCHLORIDE 100 UG/ML
INJECTION, SOLUTION INTRAVENOUS PRN
Status: DISCONTINUED | OUTPATIENT
Start: 2024-04-18 | End: 2024-04-18 | Stop reason: SDUPTHER

## 2024-04-18 RX ORDER — LIDOCAINE HYDROCHLORIDE 20 MG/ML
INJECTION, SOLUTION EPIDURAL; INFILTRATION; INTRACAUDAL; PERINEURAL PRN
Status: DISCONTINUED | OUTPATIENT
Start: 2024-04-18 | End: 2024-04-18 | Stop reason: SDUPTHER

## 2024-04-18 RX ORDER — SODIUM CHLORIDE, SODIUM LACTATE, POTASSIUM CHLORIDE, CALCIUM CHLORIDE 600; 310; 30; 20 MG/100ML; MG/100ML; MG/100ML; MG/100ML
INJECTION, SOLUTION INTRAVENOUS CONTINUOUS
Status: DISCONTINUED | OUTPATIENT
Start: 2024-04-18 | End: 2024-04-18 | Stop reason: HOSPADM

## 2024-04-18 RX ORDER — OXYCODONE HYDROCHLORIDE 5 MG/1
5 TABLET ORAL
Status: DISCONTINUED | OUTPATIENT
Start: 2024-04-18 | End: 2024-04-18 | Stop reason: HOSPADM

## 2024-04-18 RX ORDER — FENTANYL CITRATE 50 UG/ML
50 INJECTION, SOLUTION INTRAMUSCULAR; INTRAVENOUS EVERY 5 MIN PRN
Status: DISCONTINUED | OUTPATIENT
Start: 2024-04-18 | End: 2024-04-18 | Stop reason: HOSPADM

## 2024-04-18 RX ORDER — MIDAZOLAM HYDROCHLORIDE 2 MG/2ML
2 INJECTION, SOLUTION INTRAMUSCULAR; INTRAVENOUS
Status: DISCONTINUED | OUTPATIENT
Start: 2024-04-18 | End: 2024-04-18 | Stop reason: HOSPADM

## 2024-04-18 RX ORDER — HYDROMORPHONE HYDROCHLORIDE 2 MG/ML
0.5 INJECTION, SOLUTION INTRAMUSCULAR; INTRAVENOUS; SUBCUTANEOUS EVERY 10 MIN PRN
Status: DISCONTINUED | OUTPATIENT
Start: 2024-04-18 | End: 2024-04-18 | Stop reason: HOSPADM

## 2024-04-18 RX ORDER — PROPOFOL 10 MG/ML
INJECTION, EMULSION INTRAVENOUS PRN
Status: DISCONTINUED | OUTPATIENT
Start: 2024-04-18 | End: 2024-04-18 | Stop reason: SDUPTHER

## 2024-04-18 RX ADMIN — ONDANSETRON 4 MG: 2 INJECTION INTRAMUSCULAR; INTRAVENOUS at 07:30

## 2024-04-18 RX ADMIN — EPHEDRINE SULFATE 5 MG: 5 INJECTION INTRAVENOUS at 08:05

## 2024-04-18 RX ADMIN — PROPOFOL 50 MG: 10 INJECTION, EMULSION INTRAVENOUS at 07:24

## 2024-04-18 RX ADMIN — PHENYLEPHRINE HYDROCHLORIDE 100 MCG: 10 INJECTION INTRAVENOUS at 08:05

## 2024-04-18 RX ADMIN — MIDAZOLAM 2 MG: 1 INJECTION INTRAMUSCULAR; INTRAVENOUS at 07:15

## 2024-04-18 RX ADMIN — PHENYLEPHRINE HYDROCHLORIDE 200 MCG: 10 INJECTION INTRAVENOUS at 07:35

## 2024-04-18 RX ADMIN — FENTANYL CITRATE 50 MCG: 50 INJECTION, SOLUTION INTRAMUSCULAR; INTRAVENOUS at 07:21

## 2024-04-18 RX ADMIN — DEXMEDETOMIDINE 4 MCG: 100 INJECTION, SOLUTION, CONCENTRATE INTRAVENOUS at 07:44

## 2024-04-18 RX ADMIN — Medication 2000 MG: at 07:29

## 2024-04-18 RX ADMIN — ACETAMINOPHEN 1000 MG: 500 TABLET ORAL at 07:13

## 2024-04-18 RX ADMIN — PHENYLEPHRINE HYDROCHLORIDE 100 MCG: 10 INJECTION INTRAVENOUS at 07:42

## 2024-04-18 RX ADMIN — LIDOCAINE HYDROCHLORIDE 100 MG: 20 INJECTION, SOLUTION EPIDURAL; INFILTRATION; INTRACAUDAL; PERINEURAL at 07:22

## 2024-04-18 RX ADMIN — PROPOFOL 200 MG: 10 INJECTION, EMULSION INTRAVENOUS at 07:22

## 2024-04-18 RX ADMIN — DEXAMETHASONE SODIUM PHOSPHATE 4 MG: 4 INJECTION INTRA-ARTICULAR; INTRALESIONAL; INTRAMUSCULAR; INTRAVENOUS; SOFT TISSUE at 07:30

## 2024-04-18 RX ADMIN — SODIUM CHLORIDE, POTASSIUM CHLORIDE, SODIUM LACTATE AND CALCIUM CHLORIDE: 600; 310; 30; 20 INJECTION, SOLUTION INTRAVENOUS at 07:13

## 2024-04-18 RX ADMIN — PHENYLEPHRINE HYDROCHLORIDE 200 MCG: 10 INJECTION INTRAVENOUS at 07:23

## 2024-04-18 ASSESSMENT — PAIN - FUNCTIONAL ASSESSMENT
PAIN_FUNCTIONAL_ASSESSMENT: NONE - DENIES PAIN
PAIN_FUNCTIONAL_ASSESSMENT: ADULT NONVERBAL PAIN SCALE (NPVS)
PAIN_FUNCTIONAL_ASSESSMENT: 0-10

## 2024-04-18 NOTE — OP NOTE
[address]  [address]                            OPERATIVE REPORT      PATIENT NAME: FLORINDA FUENTES           : 1955  MED REC NO: [id number]                       ROOM: [facility] NO: [id number]                       ADMIT DATE: 2024  PROVIDER: Bipin Palencia DO    DATE OF SERVICE:  2024    PREOPERATIVE DIAGNOSES:  Phimosis and recurrent balanitis.    POSTOPERATIVE DIAGNOSES:  Phimosis and recurrent balanitis with penile adhesions.    PROCEDURES PERFORMED:  Freehand circumcision and takedown of penile adhesions.    SURGEON:  Bipin Palencia DO    ASSISTANT:  None.    ANESTHESIA:  General and local.    ESTIMATED BLOOD LOSS:  Less than 5 mL.    SPECIMENS REMOVED:  None.    INTRAOPERATIVE FINDINGS:  Please see dictated operative note.     COMPLICATIONS:  None immediate.    IMPLANTS:  None.    INDICATIONS:  This is a 68-year-old gentleman, who reports history of progressive phimosis as well as recurrent balanitis.  He has failed conservative therapy.  All risks, benefits, and alternatives of above-mentioned procedure have been discussed and he is willing to proceed at this time.    DESCRIPTION OF PROCEDURE:  The patient's consent was obtained.  The patient was brought back to the operating room, at which time he was placed in the supine position.  After the uneventful induction of general anesthesia, his genital area was prepped and draped and a sterile field applied.  A circumferential penile block was achieved using 10 mL of 0.25% Marcaine plain solution.  Proximal penile sleeve incision was made circumferentially.  The phimosis was stretched using a hemostat and eventually the foreskin was retracted over the glans penis.  This was cleaned using Betadine solution.  The patient did appear to have significant adhesions from the foreskin to the glans penis.  These were all taken down bluntly, past the coronal

## 2024-04-18 NOTE — H&P
[facility]  [address] [place] [address]  [phone]    Dave Babb  : 1955     HPI   68 y.o., male returns in follow up for phimosis. Pt reports a 6 mo history of progressive phimosis and recurrent balanitis. Pt has tried abx, creams etc without improvement. Stream sprayed at times. DM. PSA was 2.4 on 10/17/22.       Past Medical History:   Diagnosis Date    ADHD     BPH (benign prostatic hyperplasia)     Degenerative joint disease of left acromioclavicular joint     PT denies    Diabetes (HCC)     type 2-- does not check sqbs at home--last HA1C 6.4 24    Hyperlipidemia     Hypertension      Past Surgical History:   Procedure Laterality Date    COLONOSCOPY N/A 2022    COLONOSCOPY/ BMI 27 performed by John Allen MD at [facility] ENDOSCOPY    ORTHOPEDIC SURGERY      LT wrist    ORTHOPEDIC SURGERY      RT lower herina    ORTHOPEDIC SURGERY Bilateral 2008    knee scope     Current Facility-Administered Medications   Medication Dose Route Frequency Provider Last Rate Last Admin    naloxone 0.4 mg in 10 mL sodium chloride syringe   IntraVENous PRN Sunil Gambino MD        sodium chloride flush 0.9 % injection 5-40 mL  5-40 mL IntraVENous 2 times per day Sunil Gambino MD        sodium chloride flush 0.9 % injection 5-40 mL  5-40 mL IntraVENous PRN Sunil Gambino MD        fentaNYL (SUBLIMAZE) injection 50 mcg  50 mcg IntraVENous Q5 Min PRN Sunil Gambino MD        HYDROmorphone HCl PF (DILAUDID) injection 0.5 mg  0.5 mg IntraVENous Q10 Min PRN Sunil Gambino MD        oxyCODONE (ROXICODONE) immediate release tablet 5 mg  5 mg Oral Once PRN Sunil Gambino MD        ondansetron (ZOFRAN) injection 4 mg  4 mg IntraVENous Once PRN Sunil Gambino MD        haloperidol lactate (HALDOL) injection 1 mg  1 mg IntraVENous Once PRN Sunil Gambino MD        ipratropium 0.5 mg-albuterol 2.5 mg (DUONEB) nebulizer solution 1 Dose  1 Dose Inhalation Once PRN Immanuel  distress  Mental status - alert and oriented  Eyes - extraocular eye movements intact, sclera anicteric  Nose - normal and patent, no erythema or discharge  Mouth - mucous membranes moist  Chest - clear to auscultation bilaterally  Heart - normal rate, regular rhythm  Abdomen - soft, nontender, nondistended, no masses or organomegaly  Neurological - normal speech, no focal findings or movement disorder noted  Skin - normal coloration and turgor    Assessment/Plan  Phimosis and recurrent balanitis.  All tx options reviewed and he has elected to proceed with a circumcision.    ABIGAIL CATALAN, DO

## 2024-04-18 NOTE — TELEPHONE ENCOUNTER
Pt had surgery today and called to set up post op follow up for 2-3 weeks from now. I looked at the schedule and only saw new patient slots. Can you set up this appt and call the pt?

## 2024-04-18 NOTE — TELEPHONE ENCOUNTER
----- Message from Bipin Palencia DO sent at 4/18/2024  9:49 AM EDT -----  Regarding: pt needs post op appt with NP in 2-3wks.  Thanks.

## 2024-04-18 NOTE — ANESTHESIA POSTPROCEDURE EVALUATION
Department of Anesthesiology  Postprocedure Note    Patient: Dave Babb  MRN: [id number]  YOB: 1955  Date of evaluation: 4/18/2024    Procedure Summary       Date: 04/18/24 Room / Location: [facility] MAIN OR  / [facility] MAIN OR    Anesthesia Start: 0714 Anesthesia Stop: 0820    Procedure: CIRCUMCISION (Penis) Diagnosis:       Phimosis      (Phimosis [N47.1])    Providers: iBpin Palencia DO Responsible Provider: Sunil Gambino MD    Anesthesia Type: general ASA Status: 2            Anesthesia Type: No value filed.    Katie Phase I: Katie Score: 7    Katie Phase II: Katie Score: 10    Anesthesia Post Evaluation    Patient location during evaluation: PACU  Patient participation: complete - patient participated  Level of consciousness: awake and alert  Airway patency: patent  Nausea & Vomiting: no nausea and no vomiting  Cardiovascular status: hemodynamically stable  Respiratory status: acceptable, nonlabored ventilation and spontaneous ventilation  Hydration status: euvolemic  Comments: /73   Pulse 71   Temp 97.3 °F (36.3 °C) (Temporal)   Resp 16   Ht 1.74 m (5' 8.5\")   Wt 78.5 kg (173 lb)   SpO2 97%   BMI 25.92 kg/m²     Multimodal analgesia pain management approach  Pain management: adequate and satisfactory to patient    No notable events documented.

## 2024-04-18 NOTE — PROGRESS NOTES
visited patient in pre op, as requested.  Patient was calm and eager to complete surgery.   provided pastoral presence, prayer, and empathetic listening.  Peace be with you,  Signed by  DELGADO Nance[employer].   [phone]

## 2024-04-18 NOTE — TELEPHONE ENCOUNTER
Called patient and left voicemail saying he needed to be seen for a post op appointment with one of our NP. I left a message saying his appointment was May 8 @ 9:00 and if that time does not work just to call us back and we would change it. It is also available in [employer] to see.

## 2024-04-18 NOTE — DISCHARGE INSTRUCTIONS
May remove dressing in am if it has not fallen off sooner.  Apply neosporin daily to incision after showering.  RTO in 2-3 wks.     If you have had surgery in the past 7-10 days by one of our providers and are having fever, bleeding, or drainage from an incision, have an opening in an incision, or having issues urinating properly, please call [phone].    Adult Circumcision: What to Expect at Home  Your Recovery  Circumcision is surgery to remove the skin that covers the head of the penis. This is called the foreskin. Your doctor \"pushed\" the foreskin from the head of the penis and trimmed it off. He or she sewed down the edges using small stitches that will dissolve. Your doctor may have used any one of a number of techniques to do this. Most men go home the same day as the surgery.  Your penis may swell and bruise for the first 2 days. It is generally not very painful, and over-the-counter pain relievers such as ibuprofen or acetaminophen are all you usually need.  You will probably have a dressing over the area or over your entire penis. Follow your doctor's directions about when to remove it. Wear underwear that is comfortable for you. Some men prefer a snug fit for support, while others prefer loose-fitting briefs. The underwear should hold the penis upright. This will help the swelling go down. The swelling usually goes down within 2 to 3 weeks after surgery.  You can return to work and your normal routine when you feel ready to.  This care sheet gives you a general idea about how long it will take for you to recover. But each person recovers at a different pace. Follow the steps below to get better as quickly as possible.  How can you care for yourself at home?  Activity  Rest when you feel tired. Getting enough sleep will help you recover.  Try to walk each day. Start by walking a little more than you did the day before. Bit by bit, increase the amount you walk.  You may shower when you no longer have a

## 2024-04-18 NOTE — BRIEF OP NOTE
Brief Postoperative Note      Patient: Dave Babb  YOB: 1955  MRN: [id number]    Date of Procedure: 4/18/2024    Pre-Op Diagnosis Codes:     * Phimosis [N47.1]    Post-Op Diagnosis: Same with penile adhesions       Procedure(s):  CIRCUMCISION AND TAKEDOWN OF PENILE ADHESIONS    Surgeon(s):  Abigail Catalan DO    Assistant:  * No surgical staff found *    Anesthesia: General    Estimated Blood Loss (mL): <5cc    Complications: none immediate    Specimens:   * No specimens in log *    Implants:  * No implants in log *      Drains: * No LDAs found *        Electronically signed by ABIGAIL CATALAN DO on 4/18/2024 at 8:15 AM

## 2024-05-08 ENCOUNTER — OFFICE VISIT (OUTPATIENT)
Dept: UROLOGY | Age: 69
End: 2024-05-08
Payer: MEDICARE

## 2024-05-08 DIAGNOSIS — N40.1 BPH WITH OBSTRUCTION/LOWER URINARY TRACT SYMPTOMS: ICD-10-CM

## 2024-05-08 DIAGNOSIS — N13.8 BPH WITH OBSTRUCTION/LOWER URINARY TRACT SYMPTOMS: ICD-10-CM

## 2024-05-08 DIAGNOSIS — N47.1 PHIMOSIS: Primary | ICD-10-CM

## 2024-05-08 DIAGNOSIS — R39.13 SPLIT OF URINARY STREAM: ICD-10-CM

## 2024-05-08 DIAGNOSIS — B37.2 YEAST DERMATITIS: ICD-10-CM

## 2024-05-08 LAB
BILIRUBIN, URINE, POC: NEGATIVE
BLOOD URINE, POC: NEGATIVE
GLUCOSE URINE, POC: NEGATIVE
KETONES, URINE, POC: NEGATIVE
LEUKOCYTE ESTERASE, URINE, POC: NEGATIVE
NITRITE, URINE, POC: NEGATIVE
PH, URINE, POC: 7 (ref 4.6–8)
PROTEIN,URINE, POC: NEGATIVE
SPECIFIC GRAVITY, URINE, POC: 1.01 (ref 1–1.03)
URINALYSIS CLARITY, POC: NORMAL
URINALYSIS COLOR, POC: NORMAL
UROBILINOGEN, POC: NORMAL

## 2024-05-08 PROCEDURE — 3017F COLORECTAL CA SCREEN DOC REV: CPT | Performed by: NURSE PRACTITIONER

## 2024-05-08 PROCEDURE — G8427 DOCREV CUR MEDS BY ELIG CLIN: HCPCS | Performed by: NURSE PRACTITIONER

## 2024-05-08 PROCEDURE — 81003 URINALYSIS AUTO W/O SCOPE: CPT | Performed by: NURSE PRACTITIONER

## 2024-05-08 PROCEDURE — 1123F ACP DISCUSS/DSCN MKR DOCD: CPT | Performed by: NURSE PRACTITIONER

## 2024-05-08 PROCEDURE — 99214 OFFICE O/P EST MOD 30 MIN: CPT | Performed by: NURSE PRACTITIONER

## 2024-05-08 PROCEDURE — G8419 CALC BMI OUT NRM PARAM NOF/U: HCPCS | Performed by: NURSE PRACTITIONER

## 2024-05-08 PROCEDURE — 1036F TOBACCO NON-USER: CPT | Performed by: NURSE PRACTITIONER

## 2024-05-08 RX ORDER — TAMSULOSIN HYDROCHLORIDE 0.4 MG/1
0.4 CAPSULE ORAL
Qty: 90 CAPSULE | Refills: 3 | Status: SHIPPED | OUTPATIENT
Start: 2024-05-08

## 2024-05-08 RX ORDER — NYSTATIN 100000 [USP'U]/G
POWDER TOPICAL 2 TIMES DAILY
Qty: 60 G | Refills: 1 | Status: SHIPPED | OUTPATIENT
Start: 2024-05-08

## 2024-05-08 ASSESSMENT — ENCOUNTER SYMPTOMS
BACK PAIN: 0
NAUSEA: 0

## 2024-05-08 NOTE — PROGRESS NOTES
[facility]  [address] [address]  [phone]          Dave Babb  : 1955    Chief Complaint   Patient presents with    Follow-up          HPI     Dave Babb is a 68 y.o. male who is referred by Dr. Gagnon for evaluation of phimosis.  Pt reports a 6 mo history of progressive phimosis and recurrent balanitis.  Pt has tried abx, creams etc without improvement.  Stream sprayed at times.  DM.  PSA was 2.4 on 10/17/22. S/P freehand circumcision and takedown of penile adhesions on 24 with Dr Palencia. Back for follow up today. Some irritation along penis. He does report split urine stream assoc w weak stream. This is intermittent.       Past Medical History:   Diagnosis Date    ADHD     BPH (benign prostatic hyperplasia)     Degenerative joint disease of left acromioclavicular joint     PT denies    Diabetes (HCC)     type 2-- does not check sqbs at home--last HA1C 6.4 24    Hyperlipidemia     Hypertension      Past Surgical History:   Procedure Laterality Date    CIRCUMCISION N/A 2024    CIRCUMCISION performed by Bipin Palencia DO at [facility] MAIN OR    COLONOSCOPY N/A 2022    COLONOSCOPY/ BMI 27 performed by John Allen MD at [facility] ENDOSCOPY    ORTHOPEDIC SURGERY      LT wrist    ORTHOPEDIC SURGERY      RT lower herina    ORTHOPEDIC SURGERY Bilateral 2008    knee scope     Current Outpatient Medications   Medication Sig Dispense Refill    nystatin (MYCOSTATIN) 776822 UNIT/GM powder Apply topically 2 times daily 60 g 1    tamsulosin (FLOMAX) 0.4 MG capsule Take 1 capsule by mouth nightly 90 capsule 3    amphetamine-dextroamphetamine (ADDERALL, 30MG,) 30 MG tablet Take 1 tablet by mouth 2 times daily for 30 days. Max Daily Amount: 60 mg 60 tablet 0    [START ON 2024] amphetamine-dextroamphetamine (ADDERALL, 30MG,) 30 MG tablet Take 1 tablet by mouth 2 times daily for 30 days. Max Daily Amount: 60 mg 60 tablet 0

## 2024-06-12 ENCOUNTER — OFFICE VISIT (OUTPATIENT)
Dept: UROLOGY | Age: 69
End: 2024-06-12
Payer: MEDICARE

## 2024-06-12 DIAGNOSIS — N40.1 BPH WITH OBSTRUCTION/LOWER URINARY TRACT SYMPTOMS: ICD-10-CM

## 2024-06-12 DIAGNOSIS — N47.1 PHIMOSIS: ICD-10-CM

## 2024-06-12 DIAGNOSIS — N13.8 BPH WITH OBSTRUCTION/LOWER URINARY TRACT SYMPTOMS: ICD-10-CM

## 2024-06-12 DIAGNOSIS — R39.13 SPLIT OF URINARY STREAM: Primary | ICD-10-CM

## 2024-06-12 LAB
BILIRUBIN, URINE, POC: NEGATIVE
BLOOD URINE, POC: NEGATIVE
GLUCOSE URINE, POC: NEGATIVE
KETONES, URINE, POC: NEGATIVE
LEUKOCYTE ESTERASE, URINE, POC: NEGATIVE
NITRITE, URINE, POC: NEGATIVE
PH, URINE, POC: 6.5 (ref 4.6–8)
PROTEIN,URINE, POC: NEGATIVE
SPECIFIC GRAVITY, URINE, POC: 1.02 (ref 1–1.03)
URINALYSIS CLARITY, POC: NORMAL
URINALYSIS COLOR, POC: NORMAL
UROBILINOGEN, POC: NORMAL

## 2024-06-12 PROCEDURE — 1123F ACP DISCUSS/DSCN MKR DOCD: CPT | Performed by: NURSE PRACTITIONER

## 2024-06-12 PROCEDURE — 81003 URINALYSIS AUTO W/O SCOPE: CPT | Performed by: NURSE PRACTITIONER

## 2024-06-12 PROCEDURE — G8419 CALC BMI OUT NRM PARAM NOF/U: HCPCS | Performed by: NURSE PRACTITIONER

## 2024-06-12 PROCEDURE — 99214 OFFICE O/P EST MOD 30 MIN: CPT | Performed by: NURSE PRACTITIONER

## 2024-06-12 PROCEDURE — 1036F TOBACCO NON-USER: CPT | Performed by: NURSE PRACTITIONER

## 2024-06-12 PROCEDURE — 3017F COLORECTAL CA SCREEN DOC REV: CPT | Performed by: NURSE PRACTITIONER

## 2024-06-12 PROCEDURE — G8427 DOCREV CUR MEDS BY ELIG CLIN: HCPCS | Performed by: NURSE PRACTITIONER

## 2024-06-12 ASSESSMENT — ENCOUNTER SYMPTOMS
NAUSEA: 0
BACK PAIN: 0

## 2024-06-12 NOTE — PROGRESS NOTES
Changed pharmacy in chart to [facility]   activity: Not on file   Other Topics Concern    Not on file   Social History Narrative    Not on file     Social Determinants of Health     Financial Resource Strain: Low Risk  (8/30/2023)    Overall Financial Resource Strain (CARDIA)     Difficulty of Paying Living Expenses: Not hard at all   Food Insecurity: Not on file (8/30/2023)   Transportation Needs: Unknown (8/30/2023)    PRAPARE - Transportation     Lack of Transportation (Medical): Not on file     Lack of Transportation (Non-Medical): No   Physical Activity: Insufficiently Active (11/27/2023)    Exercise Vital Sign     Days of Exercise per Week: 2 days     Minutes of Exercise per Session: 20 min   Stress: Not on file   Social Connections: Not on file   Intimate Partner Violence: Not on file   Housing Stability: Unknown (8/30/2023)    Housing Stability Vital Sign     Unable to Pay for Housing in the Last Year: Not on file     Number of Places Lived in the Last Year: Not on file     Unstable Housing in the Last Year: No     Family History   Problem Relation Age of Onset    Osteoarthritis Mother     Diabetes Mother     No Known Problems Brother     No Known Problems Sister     Diabetes Father        Review of Systems  Constitutional:   Negative for fever.  GI:  Negative for nausea.  Genitourinary: Positive for slower stream and straining.  Musculoskeletal:  Negative for back pain.      Urinalysis  UA - Dipstick  Results for orders placed or performed in visit on 06/12/24   AMB POC URINALYSIS DIP STICK AUTO W/O MICRO   Result Value Ref Range    Color (UA POC)      Clarity (UA POC)      Glucose, Urine, POC Negative     Bilirubin, Urine, POC Negative     KETONES, Urine, POC Negative     Specific Gravity, Urine, POC 1.025 1.001 - 1.035    Blood (UA POC) Negative     pH, Urine, POC 6.5 4.6 - 8.0    Protein, Urine, POC Negative     Urobilinogen, POC 1 mg/dL     Nitrite, Urine, POC Negative     Leukocyte Esterase, Urine, POC Negative        UA - Micro  WBC - 0  RBC

## 2024-06-26 ENCOUNTER — TELEMEDICINE (OUTPATIENT)
Dept: PRIMARY CARE CLINIC | Facility: CLINIC | Age: 69
End: 2024-06-26
Payer: MEDICARE

## 2024-06-26 DIAGNOSIS — F90.0 ATTENTION DEFICIT HYPERACTIVITY DISORDER (ADHD), PREDOMINANTLY INATTENTIVE TYPE: ICD-10-CM

## 2024-06-26 PROCEDURE — 1123F ACP DISCUSS/DSCN MKR DOCD: CPT | Performed by: NURSE PRACTITIONER

## 2024-06-26 PROCEDURE — 99213 OFFICE O/P EST LOW 20 MIN: CPT | Performed by: NURSE PRACTITIONER

## 2024-06-26 RX ORDER — DEXTROAMPHETAMINE SACCHARATE, AMPHETAMINE ASPARTATE, DEXTROAMPHETAMINE SULFATE AND AMPHETAMINE SULFATE 7.5; 7.5; 7.5; 7.5 MG/1; MG/1; MG/1; MG/1
30 TABLET ORAL 2 TIMES DAILY
Qty: 60 TABLET | Refills: 0 | Status: SHIPPED | OUTPATIENT
Start: 2024-08-26 | End: 2024-09-25

## 2024-06-26 RX ORDER — DEXTROAMPHETAMINE SACCHARATE, AMPHETAMINE ASPARTATE, DEXTROAMPHETAMINE SULFATE AND AMPHETAMINE SULFATE 7.5; 7.5; 7.5; 7.5 MG/1; MG/1; MG/1; MG/1
30 TABLET ORAL 2 TIMES DAILY
Qty: 60 TABLET | Refills: 0 | Status: SHIPPED | OUTPATIENT
Start: 2024-07-26 | End: 2024-08-25

## 2024-06-26 RX ORDER — DEXTROAMPHETAMINE SACCHARATE, AMPHETAMINE ASPARTATE, DEXTROAMPHETAMINE SULFATE AND AMPHETAMINE SULFATE 7.5; 7.5; 7.5; 7.5 MG/1; MG/1; MG/1; MG/1
30 TABLET ORAL 2 TIMES DAILY
Qty: 60 TABLET | Refills: 0 | Status: SHIPPED | OUTPATIENT
Start: 2024-06-26 | End: 2024-07-26

## 2024-06-26 ASSESSMENT — ENCOUNTER SYMPTOMS
SWOLLEN GLANDS: 0
VOMITING: 0
NAUSEA: 0
COUGH: 0
SORE THROAT: 0
ABDOMINAL PAIN: 0
CHANGE IN BOWEL HABIT: 0

## 2024-06-26 NOTE — ASSESSMENT & PLAN NOTE
Given refill for Adderall 30 mg p.o. twice daily  Will cancel Psych referral    CS Use  Chronic.  Stable..  CSA: UTD. 02/29/2024  UDS: UTD. 02/29/2024  Medication: See above - 90 day refill given  Discussed warning S&S r/t medication usage. Discussed SE, AR, AE.  F/u in 3 months for re-evaluation, unless an earlier f/u is required. If improvement is noted, we will provide additional refills.  Referral:  Previously performed. Will cancel referral .    ADD/ADHD  Plan:  Psychosocial Interventions:  Psychoeducation: Educate the patient and family about ADHD, its symptoms, and management strategies.  Behavioral Therapy: Implement behavior modification techniques to address specific symptoms, improve organization, and enhance coping skills.  Parent Training: Provide parents with strategies to manage the patient's behavior effectively at home.  Academic/Work Accommodations: Collaborate with schools or employers to implement accommodations such as extended time for tasks, preferential seating, or breaks as needed.  Pharmacological Interventions:  Medication Trial: Consider initiating pharmacotherapy based on the severity of symptoms and functional impairment. Options include stimulant medications (e.g., methylphenidate, amphetamine derivatives) or non-stimulant medications (e.g., atomoxetine, guanfacine).  Start with low doses and titrate gradually while monitoring for efficacy and side effects.  Educate the patient and family about medication effects, adherence, and potential risks.  Regular Follow-up:  Schedule regular follow-up appointments to monitor treatment response, adjust interventions as necessary, and address any concerns or challenges.  Collaborate with other healthcare providers, educators, and caregivers involved in the patient's care to ensure a comprehensive approach.  Supportive Services:  Referral to Mental Health Professional: Offer ongoing counseling or therapy to address comorbid conditions (e.g.,

## 2024-06-26 NOTE — PROGRESS NOTES
6/26/2024    TELEHEALTH EVALUATION -- Audio/Visual    1. ADHD- refills tofor Adderall 30 mg PO BID. UDS and CSA completed on 02/29/2024    ADD/ADHD  Chief Complaint: The patient presents with symptoms consistent with ADD/ADHD, including difficulties with attention, impulsivity, and hyperactivity.     The symptoms have been present for [years], significantly impairing daily functioning and academic/work performance.    History:  Medical History:  has a past medical history of ADHD, BPH (benign prostatic hyperplasia), Degenerative joint disease of left acromioclavicular joint, Diabetes (HCC), Hyperlipidemia, and Hypertension.     Family History: Review of patient's family history indicates:  Problem: Osteoarthritis      Relation: Mother          Age of Onset: (Not Specified)  Problem: Diabetes      Relation: Mother          Age of Onset: (Not Specified)  Problem: No Known Problems      Relation: Brother          Age of Onset: (Not Specified)  Problem: No Known Problems      Relation: Sister          Age of Onset: (Not Specified)  Problem: Diabetes      Relation: Father          Age of Onset: (Not Specified)     Social History: Social History    Tobacco Use      Smoking status: Former      Smokeless tobacco: Never    Vaping Use      Vaping Use: Never used    Alcohol use: No    Drug use: No     Clinical Presentation: The patient exhibits the following symptoms:  Inattention: Difficulty sustaining attention, easily distracted, poor organization, forgetfulness.  Hyperactivity: Fidgeting, difficulty staying seated, excessive talking, restlessness.  Impulsivity: Difficulty waiting for turn, interrupting others, acting without thinking of consequences.  Assessment Tools:  ADHD Rating Scales (e.g., Jasiel Assessment Scale)  Clinical Interviews  Observations from family, teachers, or colleagues      ADHD  This is a chronic problem. The current episode started more than 1 year ago. The problem occurs constantly. The problem

## 2024-07-10 DIAGNOSIS — E11.40 TYPE 2 DIABETES MELLITUS WITH DIABETIC NEUROPATHY, WITHOUT LONG-TERM CURRENT USE OF INSULIN (HCC): ICD-10-CM

## 2024-07-16 ENCOUNTER — PROCEDURE VISIT (OUTPATIENT)
Dept: UROLOGY | Age: 69
End: 2024-07-16
Payer: MEDICARE

## 2024-07-16 DIAGNOSIS — N40.1 BPH WITH OBSTRUCTION/LOWER URINARY TRACT SYMPTOMS: Primary | ICD-10-CM

## 2024-07-16 DIAGNOSIS — N13.8 BPH WITH OBSTRUCTION/LOWER URINARY TRACT SYMPTOMS: Primary | ICD-10-CM

## 2024-07-16 LAB
BILIRUBIN, URINE, POC: NEGATIVE
BLOOD URINE, POC: NEGATIVE
GLUCOSE URINE, POC: NEGATIVE
KETONES, URINE, POC: NEGATIVE
LEUKOCYTE ESTERASE, URINE, POC: NEGATIVE
NITRITE, URINE, POC: NEGATIVE
PH, URINE, POC: 6 (ref 4.6–8)
PROTEIN,URINE, POC: NEGATIVE
SPECIFIC GRAVITY, URINE, POC: 1.01 (ref 1–1.03)
URINALYSIS CLARITY, POC: NORMAL
URINALYSIS COLOR, POC: NORMAL
UROBILINOGEN, POC: NORMAL

## 2024-07-16 PROCEDURE — 81003 URINALYSIS AUTO W/O SCOPE: CPT | Performed by: UROLOGY

## 2024-07-16 PROCEDURE — 52000 CYSTOURETHROSCOPY: CPT | Performed by: UROLOGY

## 2024-07-16 RX ORDER — ALFUZOSIN HYDROCHLORIDE 10 MG/1
10 TABLET, EXTENDED RELEASE ORAL DAILY
Qty: 30 TABLET | Refills: 5 | Status: SHIPPED | OUTPATIENT
Start: 2024-07-16

## 2024-07-16 NOTE — PROGRESS NOTES
g 1    tamsulosin (FLOMAX) 0.4 MG capsule Take 1 capsule by mouth nightly 90 capsule 3    atorvastatin (LIPITOR) 10 MG tablet Take 1 tablet by mouth daily 90 tablet 1    Dulaglutide 0.75 MG/0.5ML SOPN Inject 0.75 mg into the skin once a week Needed for better diabetes control. (Patient taking differently: Inject 0.5 mLs into the skin once a week Needed for better diabetes control. Takes on Sunday, last dose 4/7) 4 Adjustable Dose Pre-filled Pen Syringe 3    glimepiride (AMARYL) 4 MG tablet Take 1 tablet by mouth every morning (before breakfast) TAKE 1 TABLET BY MOUTH EVERY DAY IN THE MORNING 90 tablet 1    losartan (COZAAR) 50 MG tablet Take 1 tablet by mouth daily 90 tablet 1    vitamin D (ERGOCALCIFEROL) 1.25 MG (26030 UT) CAPS capsule Take 1 capsule by mouth once a week (Patient taking differently: Take 1 capsule by mouth once a week Takes on sunday) 12 capsule 1    gabapentin (NEURONTIN) 300 MG capsule Take 1 capsule by mouth 3 times daily for 180 days. (Patient taking differently: Take 1 capsule by mouth in the morning and at bedtime.) 270 capsule 1    naproxen sodium (ANAPROX) 220 MG tablet Take 1 tablet by mouth 2 times daily (with meals)       No current facility-administered medications for this visit.     No Known Allergies  Social History     Socioeconomic History    Marital status:      Spouse name: Not on file    Number of children: Not on file    Years of education: Not on file    Highest education level: Not on file   Occupational History    Not on file   Tobacco Use    Smoking status: Former    Smokeless tobacco: Never   Vaping Use    Vaping Use: Never used   Substance and Sexual Activity    Alcohol use: No    Drug use: No    Sexual activity: Not on file   Other Topics Concern    Not on file   Social History Narrative    Not on file     Social Determinants of Health     Financial Resource Strain: Low Risk  (8/30/2023)    Overall Financial Resource Strain (CARDIA)     Difficulty of Paying

## 2024-08-22 ENCOUNTER — OFFICE VISIT (OUTPATIENT)
Dept: UROLOGY | Age: 69
End: 2024-08-22
Payer: MEDICARE

## 2024-08-22 DIAGNOSIS — N13.8 BPH WITH OBSTRUCTION/LOWER URINARY TRACT SYMPTOMS: Primary | ICD-10-CM

## 2024-08-22 DIAGNOSIS — N40.1 BPH WITH OBSTRUCTION/LOWER URINARY TRACT SYMPTOMS: Primary | ICD-10-CM

## 2024-08-22 PROCEDURE — 3017F COLORECTAL CA SCREEN DOC REV: CPT | Performed by: UROLOGY

## 2024-08-22 PROCEDURE — 99214 OFFICE O/P EST MOD 30 MIN: CPT | Performed by: UROLOGY

## 2024-08-22 PROCEDURE — G8427 DOCREV CUR MEDS BY ELIG CLIN: HCPCS | Performed by: UROLOGY

## 2024-08-22 PROCEDURE — 1036F TOBACCO NON-USER: CPT | Performed by: UROLOGY

## 2024-08-22 PROCEDURE — 81003 URINALYSIS AUTO W/O SCOPE: CPT | Performed by: UROLOGY

## 2024-08-22 PROCEDURE — 1123F ACP DISCUSS/DSCN MKR DOCD: CPT | Performed by: UROLOGY

## 2024-08-22 PROCEDURE — G8419 CALC BMI OUT NRM PARAM NOF/U: HCPCS | Performed by: UROLOGY

## 2024-08-22 RX ORDER — ALFUZOSIN HYDROCHLORIDE 10 MG/1
10 TABLET, EXTENDED RELEASE ORAL
Qty: 90 TABLET | Refills: 3 | Status: SHIPPED | OUTPATIENT
Start: 2024-08-22

## 2024-08-22 NOTE — PROGRESS NOTES
movement disorder noted  Skin - normal coloration and turgor      Urinalysis  UA - Dipstick  Results for orders placed or performed in visit on 08/22/24   AMB POC URINALYSIS DIP STICK AUTO W/O MICRO   Result Value Ref Range    Color (UA POC)      Clarity (UA POC)      Glucose, Urine, POC Negative     Bilirubin, Urine, POC Negative     KETONES, Urine, POC Negative     Specific Gravity, Urine, POC 1.020 1.001 - 1.035    Blood (UA POC) Negative     pH, Urine, POC 6.5 4.6 - 8.0    Protein, Urine, POC Negative     Urobilinogen, POC 1 mg/dL     Nitrite, Urine, POC Negative     Leukocyte Esterase, Urine, POC Negative          UA - Micro  WBC - 0  RBC - 0  Bacteria - 0  Epith - 0    Assessment/Plan    ICD-10-CM    1. BPH with obstruction/lower urinary tract symptoms  N40.1 AMB POC URINALYSIS DIP STICK AUTO W/O MICRO    N13.8 PSA, Diagnostic        Uroxatral refilled.  RTO in 12 mo with PSA prior.    ABIGAIL CATALAN, DO    Total time for today's encounter including chart review, result review, documentation and face to face encounter was 31 minutes.

## 2024-08-28 ENCOUNTER — LAB (OUTPATIENT)
Dept: PRIMARY CARE CLINIC | Facility: CLINIC | Age: 69
End: 2024-08-28

## 2024-08-28 DIAGNOSIS — E11.40 TYPE 2 DIABETES MELLITUS WITH DIABETIC NEUROPATHY, WITHOUT LONG-TERM CURRENT USE OF INSULIN (HCC): ICD-10-CM

## 2024-08-28 DIAGNOSIS — E11.40 TYPE 2 DIABETES MELLITUS WITH DIABETIC NEUROPATHY, WITHOUT LONG-TERM CURRENT USE OF INSULIN (HCC): Primary | ICD-10-CM

## 2024-08-28 LAB
EST. AVERAGE GLUCOSE BLD GHB EST-MCNC: 143 MG/DL
HBA1C MFR BLD: 6.6 % (ref 0–5.6)

## 2024-08-29 NOTE — RESULT ENCOUNTER NOTE
Please let the patient know:    A1c is elevated at 6.6.  Is he still taking Trulicity? We can increase dosage  He is on Metformin 2500 mg daily  He is on glimepiride 4 mg daily    MyChart message sent as well    Explanatory note: Be assured that the information provided to create your medical record comes from your provider. The written transcription portion of this note is prepared electronically by voice-recognition software. At times, there may be some errors in capitalization, punctuation, tense, or context that are inherent in the system, but your provider reviews the note for content and to ensure that the note contains appropriate information for your continuing care.

## 2024-09-04 DIAGNOSIS — E11.40 TYPE 2 DIABETES MELLITUS WITH DIABETIC NEUROPATHY, WITHOUT LONG-TERM CURRENT USE OF INSULIN (HCC): ICD-10-CM

## 2024-09-04 RX ORDER — DULAGLUTIDE 1.5 MG/.5ML
1.5 INJECTION, SOLUTION SUBCUTANEOUS WEEKLY
Qty: 4 ADJUSTABLE DOSE PRE-FILLED PEN SYRINGE | Refills: 5 | Status: SHIPPED | OUTPATIENT
Start: 2024-09-04

## 2024-09-21 DIAGNOSIS — E11.40 TYPE 2 DIABETES MELLITUS WITH DIABETIC NEUROPATHY, WITHOUT LONG-TERM CURRENT USE OF INSULIN (HCC): ICD-10-CM

## 2024-09-21 DIAGNOSIS — E78.2 MIXED HYPERLIPIDEMIA: ICD-10-CM

## 2024-09-21 DIAGNOSIS — I10 PRIMARY HYPERTENSION: ICD-10-CM

## 2024-09-23 RX ORDER — ATORVASTATIN CALCIUM 10 MG/1
10 TABLET, FILM COATED ORAL DAILY
Qty: 90 TABLET | Refills: 1 | Status: SHIPPED | OUTPATIENT
Start: 2024-09-23

## 2024-09-23 RX ORDER — LOSARTAN POTASSIUM 50 MG/1
50 TABLET ORAL DAILY
Qty: 90 TABLET | Refills: 1 | Status: SHIPPED | OUTPATIENT
Start: 2024-09-23

## 2024-09-24 ENCOUNTER — TELEMEDICINE (OUTPATIENT)
Dept: PRIMARY CARE CLINIC | Facility: CLINIC | Age: 69
End: 2024-09-24
Payer: MEDICARE

## 2024-09-24 DIAGNOSIS — F90.0 ATTENTION DEFICIT HYPERACTIVITY DISORDER (ADHD), PREDOMINANTLY INATTENTIVE TYPE: Primary | ICD-10-CM

## 2024-09-24 DIAGNOSIS — E55.9 VITAMIN D DEFICIENCY: ICD-10-CM

## 2024-09-24 DIAGNOSIS — I10 PRIMARY HYPERTENSION: ICD-10-CM

## 2024-09-24 DIAGNOSIS — E78.2 MIXED HYPERLIPIDEMIA: ICD-10-CM

## 2024-09-24 DIAGNOSIS — E11.40 TYPE 2 DIABETES MELLITUS WITH DIABETIC NEUROPATHY, WITHOUT LONG-TERM CURRENT USE OF INSULIN (HCC): ICD-10-CM

## 2024-09-24 PROCEDURE — 3044F HG A1C LEVEL LT 7.0%: CPT | Performed by: NURSE PRACTITIONER

## 2024-09-24 PROCEDURE — G2211 COMPLEX E/M VISIT ADD ON: HCPCS | Performed by: NURSE PRACTITIONER

## 2024-09-24 PROCEDURE — 99214 OFFICE O/P EST MOD 30 MIN: CPT | Performed by: NURSE PRACTITIONER

## 2024-09-24 PROCEDURE — 1123F ACP DISCUSS/DSCN MKR DOCD: CPT | Performed by: NURSE PRACTITIONER

## 2024-09-24 RX ORDER — GABAPENTIN 300 MG/1
300 CAPSULE ORAL 3 TIMES DAILY
Qty: 270 CAPSULE | Refills: 1 | Status: SHIPPED | OUTPATIENT
Start: 2024-09-24 | End: 2025-03-23

## 2024-09-24 RX ORDER — GLIMEPIRIDE 4 MG/1
4 TABLET ORAL
Qty: 90 TABLET | Refills: 1 | Status: SHIPPED | OUTPATIENT
Start: 2024-09-24

## 2024-09-24 RX ORDER — DEXTROAMPHETAMINE SACCHARATE, AMPHETAMINE ASPARTATE, DEXTROAMPHETAMINE SULFATE AND AMPHETAMINE SULFATE 7.5; 7.5; 7.5; 7.5 MG/1; MG/1; MG/1; MG/1
30 TABLET ORAL 2 TIMES DAILY
Qty: 60 TABLET | Refills: 0 | Status: SHIPPED | OUTPATIENT
Start: 2024-11-24 | End: 2024-12-24

## 2024-09-24 RX ORDER — DEXTROAMPHETAMINE SACCHARATE, AMPHETAMINE ASPARTATE, DEXTROAMPHETAMINE SULFATE AND AMPHETAMINE SULFATE 7.5; 7.5; 7.5; 7.5 MG/1; MG/1; MG/1; MG/1
30 TABLET ORAL 2 TIMES DAILY
Qty: 60 TABLET | Refills: 0 | Status: SHIPPED | OUTPATIENT
Start: 2024-10-24 | End: 2024-11-23

## 2024-09-24 RX ORDER — ERGOCALCIFEROL 1.25 MG/1
50000 CAPSULE, LIQUID FILLED ORAL WEEKLY
Qty: 12 CAPSULE | Refills: 1 | Status: SHIPPED | OUTPATIENT
Start: 2024-09-24

## 2024-09-24 RX ORDER — DEXTROAMPHETAMINE SACCHARATE, AMPHETAMINE ASPARTATE, DEXTROAMPHETAMINE SULFATE AND AMPHETAMINE SULFATE 7.5; 7.5; 7.5; 7.5 MG/1; MG/1; MG/1; MG/1
30 TABLET ORAL 2 TIMES DAILY
Qty: 60 TABLET | Refills: 0 | Status: SHIPPED | OUTPATIENT
Start: 2024-09-24 | End: 2024-10-24

## 2024-09-24 SDOH — ECONOMIC STABILITY: FOOD INSECURITY: WITHIN THE PAST 12 MONTHS, THE FOOD YOU BOUGHT JUST DIDN'T LAST AND YOU DIDN'T HAVE MONEY TO GET MORE.: NEVER TRUE

## 2024-09-24 SDOH — ECONOMIC STABILITY: FOOD INSECURITY: WITHIN THE PAST 12 MONTHS, YOU WORRIED THAT YOUR FOOD WOULD RUN OUT BEFORE YOU GOT MONEY TO BUY MORE.: NEVER TRUE

## 2024-09-24 SDOH — ECONOMIC STABILITY: INCOME INSECURITY: HOW HARD IS IT FOR YOU TO PAY FOR THE VERY BASICS LIKE FOOD, HOUSING, MEDICAL CARE, AND HEATING?: NOT HARD AT ALL

## 2024-09-24 ASSESSMENT — ENCOUNTER SYMPTOMS
COUGH: 0
VOMITING: 0
NAUSEA: 0
ABDOMINAL PAIN: 0
SWOLLEN GLANDS: 0
CHANGE IN BOWEL HABIT: 0
SORE THROAT: 0

## 2025-01-08 ENCOUNTER — TELEMEDICINE (OUTPATIENT)
Dept: PRIMARY CARE CLINIC | Facility: CLINIC | Age: 70
End: 2025-01-08

## 2025-01-08 DIAGNOSIS — E11.40 TYPE 2 DIABETES MELLITUS WITH DIABETIC NEUROPATHY, WITHOUT LONG-TERM CURRENT USE OF INSULIN (HCC): ICD-10-CM

## 2025-01-08 DIAGNOSIS — R53.81 MALAISE AND FATIGUE: ICD-10-CM

## 2025-01-08 DIAGNOSIS — N52.9 ERECTILE DYSFUNCTION, UNSPECIFIED ERECTILE DYSFUNCTION TYPE: ICD-10-CM

## 2025-01-08 DIAGNOSIS — Z12.5 SCREENING FOR PROSTATE CANCER: ICD-10-CM

## 2025-01-08 DIAGNOSIS — Z79.899 ENCOUNTER FOR LONG-TERM (CURRENT) USE OF MEDICATIONS: ICD-10-CM

## 2025-01-08 DIAGNOSIS — Z13.21 SCREENING FOR ENDOCRINE, NUTRITIONAL, METABOLIC AND IMMUNITY DISORDER: ICD-10-CM

## 2025-01-08 DIAGNOSIS — R73.09 OTHER ABNORMAL GLUCOSE: ICD-10-CM

## 2025-01-08 DIAGNOSIS — Z13.0 SCREENING FOR ENDOCRINE, NUTRITIONAL, METABOLIC AND IMMUNITY DISORDER: ICD-10-CM

## 2025-01-08 DIAGNOSIS — Z13.228 SCREENING FOR ENDOCRINE, METABOLIC AND IMMUNITY DISORDER: ICD-10-CM

## 2025-01-08 DIAGNOSIS — N40.0 BPH WITHOUT URINARY OBSTRUCTION: ICD-10-CM

## 2025-01-08 DIAGNOSIS — N47.1 PHIMOSIS: ICD-10-CM

## 2025-01-08 DIAGNOSIS — Z13.0 SCREENING FOR ENDOCRINE, METABOLIC AND IMMUNITY DISORDER: ICD-10-CM

## 2025-01-08 DIAGNOSIS — Z13.29 SCREENING FOR THYROID DISORDER: ICD-10-CM

## 2025-01-08 DIAGNOSIS — R79.0 LOW MAGNESIUM LEVEL: ICD-10-CM

## 2025-01-08 DIAGNOSIS — R79.9 ABNORMAL BLOOD CHEMISTRY: ICD-10-CM

## 2025-01-08 DIAGNOSIS — Z13.0 SCREENING FOR DEFICIENCY ANEMIA: ICD-10-CM

## 2025-01-08 DIAGNOSIS — E55.9 VITAMIN D DEFICIENCY: ICD-10-CM

## 2025-01-08 DIAGNOSIS — F90.0 ATTENTION DEFICIT HYPERACTIVITY DISORDER (ADHD), PREDOMINANTLY INATTENTIVE TYPE: ICD-10-CM

## 2025-01-08 DIAGNOSIS — R53.82 CHRONIC FATIGUE: ICD-10-CM

## 2025-01-08 DIAGNOSIS — Z13.228 SCREENING FOR ENDOCRINE, NUTRITIONAL, METABOLIC AND IMMUNITY DISORDER: ICD-10-CM

## 2025-01-08 DIAGNOSIS — Z13.0 SCREENING FOR IRON DEFICIENCY ANEMIA: ICD-10-CM

## 2025-01-08 DIAGNOSIS — Z13.220 SCREENING FOR LIPID DISORDERS: ICD-10-CM

## 2025-01-08 DIAGNOSIS — R53.81 MALAISE: ICD-10-CM

## 2025-01-08 DIAGNOSIS — Z13.228 SCREENING FOR METABOLIC DISORDER: ICD-10-CM

## 2025-01-08 DIAGNOSIS — Z13.29 SCREENING FOR ENDOCRINE, NUTRITIONAL, METABOLIC AND IMMUNITY DISORDER: ICD-10-CM

## 2025-01-08 DIAGNOSIS — R53.83 MALAISE AND FATIGUE: ICD-10-CM

## 2025-01-08 DIAGNOSIS — R79.89 OTHER SPECIFIED ABNORMAL FINDINGS OF BLOOD CHEMISTRY: ICD-10-CM

## 2025-01-08 DIAGNOSIS — I10 PRIMARY HYPERTENSION: ICD-10-CM

## 2025-01-08 DIAGNOSIS — Z13.21 ENCOUNTER FOR VITAMIN DEFICIENCY SCREENING: ICD-10-CM

## 2025-01-08 DIAGNOSIS — Z13.29 SCREENING FOR ENDOCRINE, METABOLIC AND IMMUNITY DISORDER: ICD-10-CM

## 2025-01-08 DIAGNOSIS — N48.1 BALANITIS: ICD-10-CM

## 2025-01-08 DIAGNOSIS — Z00.00 MEDICARE ANNUAL WELLNESS VISIT, SUBSEQUENT: Primary | ICD-10-CM

## 2025-01-08 DIAGNOSIS — R53.83 OTHER FATIGUE: ICD-10-CM

## 2025-01-08 DIAGNOSIS — E78.2 MIXED HYPERLIPIDEMIA: ICD-10-CM

## 2025-01-08 DIAGNOSIS — N47.5 PENILE ADHESIONS: ICD-10-CM

## 2025-01-08 DIAGNOSIS — Z13.1 SCREENING FOR DIABETES MELLITUS: ICD-10-CM

## 2025-01-08 PROBLEM — R68.84 PAIN IN LOWER JAW: Status: RESOLVED | Noted: 2023-11-27 | Resolved: 2025-01-08

## 2025-01-08 PROBLEM — M25.512 ACUTE PAIN OF LEFT SHOULDER: Status: RESOLVED | Noted: 2019-02-06 | Resolved: 2025-01-08

## 2025-01-08 PROBLEM — M25.562 ACUTE PAIN OF BOTH KNEES: Status: RESOLVED | Noted: 2019-02-06 | Resolved: 2025-01-08

## 2025-01-08 PROBLEM — M25.561 ACUTE PAIN OF BOTH KNEES: Status: RESOLVED | Noted: 2019-02-06 | Resolved: 2025-01-08

## 2025-01-08 PROBLEM — L03.90 CELLULITIS: Status: RESOLVED | Noted: 2024-02-29 | Resolved: 2025-01-08

## 2025-01-08 RX ORDER — DEXTROAMPHETAMINE SACCHARATE, AMPHETAMINE ASPARTATE, DEXTROAMPHETAMINE SULFATE AND AMPHETAMINE SULFATE 7.5; 7.5; 7.5; 7.5 MG/1; MG/1; MG/1; MG/1
30 TABLET ORAL 2 TIMES DAILY
Qty: 60 TABLET | Refills: 0 | Status: SHIPPED | OUTPATIENT
Start: 2025-01-08 | End: 2025-02-07

## 2025-01-08 RX ORDER — DEXTROAMPHETAMINE SACCHARATE, AMPHETAMINE ASPARTATE, DEXTROAMPHETAMINE SULFATE AND AMPHETAMINE SULFATE 7.5; 7.5; 7.5; 7.5 MG/1; MG/1; MG/1; MG/1
30 TABLET ORAL 2 TIMES DAILY
Qty: 60 TABLET | Refills: 0 | Status: SHIPPED | OUTPATIENT
Start: 2025-02-05 | End: 2025-03-07

## 2025-01-08 RX ORDER — DULAGLUTIDE 0.75 MG/.5ML
0.75 INJECTION, SOLUTION SUBCUTANEOUS WEEKLY
Qty: 6 ML | Refills: 1 | Status: SHIPPED | OUTPATIENT
Start: 2025-01-08

## 2025-01-08 RX ORDER — DEXTROAMPHETAMINE SACCHARATE, AMPHETAMINE ASPARTATE, DEXTROAMPHETAMINE SULFATE AND AMPHETAMINE SULFATE 7.5; 7.5; 7.5; 7.5 MG/1; MG/1; MG/1; MG/1
30 TABLET ORAL 2 TIMES DAILY
Qty: 60 TABLET | Refills: 0 | Status: SHIPPED | OUTPATIENT
Start: 2025-03-05 | End: 2025-04-04

## 2025-01-08 SDOH — ECONOMIC STABILITY: FOOD INSECURITY: WITHIN THE PAST 12 MONTHS, YOU WORRIED THAT YOUR FOOD WOULD RUN OUT BEFORE YOU GOT MONEY TO BUY MORE.: NEVER TRUE

## 2025-01-08 SDOH — ECONOMIC STABILITY: FOOD INSECURITY: WITHIN THE PAST 12 MONTHS, THE FOOD YOU BOUGHT JUST DIDN'T LAST AND YOU DIDN'T HAVE MONEY TO GET MORE.: NEVER TRUE

## 2025-01-08 ASSESSMENT — PATIENT HEALTH QUESTIONNAIRE - PHQ9
2. FEELING DOWN, DEPRESSED OR HOPELESS: NOT AT ALL
SUM OF ALL RESPONSES TO PHQ QUESTIONS 1-9: 0
SUM OF ALL RESPONSES TO PHQ QUESTIONS 1-9: 0
1. LITTLE INTEREST OR PLEASURE IN DOING THINGS: NOT AT ALL
SUM OF ALL RESPONSES TO PHQ QUESTIONS 1-9: 0
SUM OF ALL RESPONSES TO PHQ9 QUESTIONS 1 & 2: 0
SUM OF ALL RESPONSES TO PHQ QUESTIONS 1-9: 0

## 2025-01-08 ASSESSMENT — ENCOUNTER SYMPTOMS
COUGH: 0
VOMITING: 0
NAUSEA: 0
ABDOMINAL PAIN: 0
SORE THROAT: 0

## 2025-01-08 NOTE — PATIENT INSTRUCTIONS
professional. [facility] [employer], [facility], disclaims any warranty or liability for your use of this information.    Personalized Preventive Plan for Dave Babb - 1/8/2025  Medicare offers a range of preventive health benefits. Some of the tests and screenings are paid in full while other may be subject to a deductible, co-insurance, and/or copay.  Some of these benefits include a comprehensive review of your medical history including lifestyle, illnesses that may run in your family, and various assessments and screenings as appropriate.  After reviewing your medical record and screening and assessments performed today your provider may have ordered immunizations, labs, imaging, and/or referrals for you.  A list of these orders (if applicable) as well as your Preventive Care list are included within your After Visit Summary for your review.

## 2025-01-08 NOTE — PROGRESS NOTES
Take 1 tablet by mouth daily (with breakfast) Script #1 of 2 - Total of 2500 mg of Metformin - 1500 mg in AM (with 2 doses) and 1000 mg in PM Yes Sherlyn Leon APRN - NP   amphetamine-dextroamphetamine (ADDERALL, 30MG,) 30 MG tablet Take 1 tablet by mouth 2 times daily for 30 days. Max Daily Amount: 60 mg Yes Sherlyn Leon APRN - NP   amphetamine-dextroamphetamine (ADDERALL, 30MG,) 30 MG tablet Take 1 tablet by mouth 2 times daily for 30 days. Max Daily Amount: 60 mg Yes Sherlyn Leon APRN - NP   dulaglutide (TRULICITY) 0.75 MG/0.5ML SOAJ SC injection Inject 0.5 mLs into the skin once a week Yes Sherlyn Leon APRN - NP   metFORMIN (GLUCOPHAGE) 1000 MG tablet Take 1 tablet by mouth 2 times daily (with meals) Script #2 of 2 - Total of 2500 mg of Metformin - 1500 mg in AM (with 2 doses) and 1000 mg in PM Yes Sherlyn Leon APRN - NP   gabapentin (NEURONTIN) 300 MG capsule Take 1 capsule by mouth 3 times daily for 180 days. Yes Sherlyn Leon APRN - NP   vitamin D (ERGOCALCIFEROL) 1.25 MG (81061 UT) CAPS capsule Take 1 capsule by mouth once a week Yes Sherlyn Leon APRN - NP   glimepiride (AMARYL) 4 MG tablet Take 1 tablet by mouth every morning (before breakfast) Yes Sherlyn Leon APRN - NP   losartan (COZAAR) 50 MG tablet TAKE 1 TABLET BY MOUTH EVERY DAY Yes Sherlyn Leon APRN - NP   atorvastatin (LIPITOR) 10 MG tablet TAKE 1 TABLET BY MOUTH EVERY DAY Yes Sherlyn Leon APRN - NP   alfuzosin (UROXATRAL) 10 MG extended release tablet Take 1 tablet by mouth nightly Yes Bipin Palencia DO   alfuzosin (UROXATRAL) 10 MG extended release tablet Take 1 tablet by mouth daily Yes Bipin Palencia DO CareTeam (Including outside providers/suppliers regularly involved in providing care):   Patient Care Team:  Sherlyn Leon APRN - NP as PCP - General (Nurse Practitioner)  Sherlyn Leon APRN - NP as PCP - Empaneled Provider     Recommendations for Preventive Services Due: see orders and patient

## 2025-01-08 NOTE — ASSESSMENT & PLAN NOTE
On Losartan 50 mg PO QD    HTN    Chronic. Stable/Well-Controlled  Labs: at least annually. Recent labs reviewed.  Non-laboratory testing: ECG (as indicated, not performed today)  Medication:  Given refill for losartan  Medication and dosage is unchanged today.  Discussed warning S&S r/t medication usage. Discussed SE, AR, AE.  Tolerating medication well. No SE, AR, AE. Benefits outweigh risks. Prefers to continue medication as currently prescribed.  Encourage appropriate rest and fluid intake  Encourage lifestyle changes, including healthy eating, exercise, limiting alcohol/tobacco use, and stress reducers. DASH diet. Reduce sodium intake. Limit alcohol consumption to < 1 oz/day.  Monitor BP and HR at home. Keep a log and bring to each visit.  F/u in 6 months for re-evaluation, unless an earlier f/u is required. If improvement is noted, we will provide additional refills.   
 On vitamin D weekly supplementation    Vitamin D Deficiency    Stable/Well-Controlled  Denies symptoms    Patient Education:  Common risk factors for vitamin D deficiency include inadequate sun exposure or dietary intake; obesity; and black or  ethnicity and obesity.  Educated on interactions with other medications  Stressed the importance of compliance with supplemental therapy  Explained the need for lifelong treatment  Encouraged patient to report any new symptoms or a change in symptoms  Encourage proper diet, exercise, lifestyle changes  Medication Usage:  ?  Vitamin D sufficient (25-OH vitamin D 20 ng/ml)  Vitamin D 800 to 2,000 IU/day D-   (animal derived) may be slightly more effective than DI (plant derived), but clinical significance is uncertain.  ?  Calcium supplementation: unclear benefit and may increase some congenital heart disease risk in patients; no supplementation currently required  Vitamin D deficiency (25-OH vitamin D <20 ng/ml)  50,000 IU/week for 8 to 12 weeks, followed by 800 to 2,000 IU/day of vitamin DI    Labs:  Evaluated at least annually  Repeat if new or change in symptoms    Lab Results   Component Value Date/Time    VITD25 32.7 02/22/2024 02:32 PM    VITD25 48.5 11/22/2023 02:20 PM    VITD25 29.3 (L) 08/30/2023 02:35 PM       
 Stable. Chronic  Does see Urology  Continue current tx plan  On Alfuzosin 10 mg PO QD   
On metformin 2500 mg PO QD  On glimepiride 4 mg daily  Requesting to switch from Ozempic to Trulicity.  He was previously on Trulicity he would like to do 0.75.  Does not see specialist    On atorvastatin 10 mg daily  On losartan 50 mg daily  On gabapentin 300 mg p.o. 3 times daily     DM Type II    Chronic. Stable/Well-Controlled  Labs: A1C (as indicated) with annual labs. Recent labs reviewed.  Medication:  See above  Discussed warning S&S r/t medication usage. Discussed SE, AR, AE.  Tolerating medication well. No SE, AR, AE. Benefits outweigh risks. Prefers to continue medication as currently prescribed  Encourage appropriate rest and fluid intake  Encourage lifestyle changes, including healthy eating, exercise, limiting alcohol/tobacco use, and stress reducers.  Monitor BP and HR at home. Keep a log and bring to each visit.  Monitor BGL readings at home as directed (4x daily). Keep a log and bring to each visit.  F/u in 6 months for re-evaluation, unless an earlier f/u is required     General Measures:  Recommended diabetic foot exam at every visit.  Recommended annual diabetic eye exam  Monitor for control of cardiovascular risk factors including BP and lipids  Provided diabetes self-management education     Monofilament:Performed today. Sensory exam of the foot is STABLE, tested with the monofilament. Good pulses, no lesions or ulcers, good peripheral pulses.     No components found for: \"HGBA1C\"         Lab Results   Component Value Date     LDLCALC 29.8 02/22/2024     CREATININE 1.00 02/22/2024         Diabetes (Patient Education)       Weight loss through dietary modification can improve many aspects of type 2 diabetes including glycemic control and hypertension. The improvement in glycemic control is related to both the degree caloric restriction and weight reduction. 150 minutes of intense aerobic exercise per week is shown to assist in diabetes management. Management of diabetes is good for long term 
On metformin 2500 mg PO QD  On glimepiride 4 mg daily  Requesting to switch from Ozempic to Trulicity.  He was previously on Trulicity he would like to do 0.75.  Does not see specialist    On atorvastatin 10 mg daily  On losartan 50 mg daily  On gabapentin 300 mg p.o. 3 times daily    Hyperlipidemia    Chronic. Stable/Well-Controlled.  Labs: at least annually. .  Non-laboratory testing: None indicated today.  Medication:  See above    Discussed warning S&S r/t medication usage. Discussed SE, AR, AE.  F/u in 6 months for re-evaluation, unless an earlier f/u is required. If improvement is noted, we will provide additional refills.    Diet/Exercise/Lifestyle Changes  Encourage appropriate rest and fluid intake  Encourage lifestyle changes, including healthy eating, exercise, limiting alcohol/tobacco use, and stress reducers.  Dietary Considerations  Eat more grains (substitute quinoa or brown rice instead of potatoes or pasta)  Eat more nuts (unsalted)  Eat more omega 3 fats (2 servings per week)  [place], mackerel, herring, tuna, flax, walnuts, spinach, broccoli, edamame, omega supplements  Eat more applies, pears, oranges, and mir  Use olive oil when possible  Olive oil is not good for high heat applications like frying  Try using canola oil with high heat applications  Can be mixed with red wine vinegar, minced garlic, and ground pepper to make salad dressing  Drizzle 3 tbsp olive oil over vegetables (carrots, leeks). Scatter on some herbs, cover with foil. Bake at 375 for 45 minutes.  Try different foods  Avocado  Tofu  Activity Changes:  Exercise - try to get at least 15 minutes of dedicated activity every day  Eat smaller portions  Things to limit or take out of your diet:  Avoid soda, baked goods, candies, cookies as much as possible (these have high sugar content)  Avoid french fries, crackers, cakes, chips as much as possible (these have high trans fat content)  Limit eggs (4 per week)  Limit saturated fats 
Stable. Chronic  Does see Urology  Continue current tx plan  
Stable. Chronic  Does see Urology  Continue current tx plan  On Alfuzosin 10 mg PO QD   
psychosocial stressors.  Support Groups: Connect the patient and family with support groups or community resources for individuals with ADHD to provide additional support and validation.  Lifestyle Modifications:  Encourage healthy lifestyle habits, including regular exercise, adequate sleep, and balanced nutrition, which can help mitigate symptoms of ADHD.  Limit screen time and exposure to electronic devices, particularly before bedtime, to improve sleep quality and attention span.  Safety Planning:  Address any safety concerns related to impulsivity or risk-taking behaviors by implementing safety plans at home, school, or work.  Long-Term Management:  Emphasize the importance of ongoing management of ADHD symptoms throughout the patient's lifespan, including adolescence and adulthood.  Discuss strategies for transitioning to adult healthcare services and managing ADHD in different life stages.     This assessment and plan will be implemented with regular reassessment and adjustments as needed to optimize the patient's functioning and quality of life while managing ADHD symptoms effectively.

## 2025-03-19 DIAGNOSIS — E11.40 TYPE 2 DIABETES MELLITUS WITH DIABETIC NEUROPATHY, WITHOUT LONG-TERM CURRENT USE OF INSULIN (HCC): ICD-10-CM

## 2025-03-19 DIAGNOSIS — E78.2 MIXED HYPERLIPIDEMIA: ICD-10-CM

## 2025-03-19 DIAGNOSIS — I10 PRIMARY HYPERTENSION: ICD-10-CM

## 2025-03-19 RX ORDER — LOSARTAN POTASSIUM 50 MG/1
50 TABLET ORAL DAILY
Qty: 90 TABLET | Refills: 1 | OUTPATIENT
Start: 2025-03-19

## 2025-03-19 RX ORDER — ATORVASTATIN CALCIUM 10 MG/1
10 TABLET, FILM COATED ORAL DAILY
Qty: 90 TABLET | Refills: 1 | OUTPATIENT
Start: 2025-03-19

## 2025-04-15 ENCOUNTER — TELEMEDICINE (OUTPATIENT)
Dept: PRIMARY CARE CLINIC | Facility: CLINIC | Age: 70
End: 2025-04-15
Payer: MEDICARE

## 2025-04-15 DIAGNOSIS — F90.0 ATTENTION DEFICIT HYPERACTIVITY DISORDER (ADHD), PREDOMINANTLY INATTENTIVE TYPE: ICD-10-CM

## 2025-04-15 DIAGNOSIS — N40.0 BPH WITHOUT URINARY OBSTRUCTION: ICD-10-CM

## 2025-04-15 DIAGNOSIS — N52.9 ERECTILE DYSFUNCTION, UNSPECIFIED ERECTILE DYSFUNCTION TYPE: ICD-10-CM

## 2025-04-15 DIAGNOSIS — I10 PRIMARY HYPERTENSION: Primary | ICD-10-CM

## 2025-04-15 DIAGNOSIS — E55.9 VITAMIN D DEFICIENCY: ICD-10-CM

## 2025-04-15 DIAGNOSIS — N47.1 PHIMOSIS: ICD-10-CM

## 2025-04-15 DIAGNOSIS — E78.2 MIXED HYPERLIPIDEMIA: ICD-10-CM

## 2025-04-15 DIAGNOSIS — E11.40 TYPE 2 DIABETES MELLITUS WITH DIABETIC NEUROPATHY, WITHOUT LONG-TERM CURRENT USE OF INSULIN (HCC): ICD-10-CM

## 2025-04-15 DIAGNOSIS — N48.1 BALANITIS: ICD-10-CM

## 2025-04-15 DIAGNOSIS — N47.5 PENILE ADHESIONS: ICD-10-CM

## 2025-04-15 PROCEDURE — 99214 OFFICE O/P EST MOD 30 MIN: CPT | Performed by: NURSE PRACTITIONER

## 2025-04-15 PROCEDURE — 1160F RVW MEDS BY RX/DR IN RCRD: CPT | Performed by: NURSE PRACTITIONER

## 2025-04-15 PROCEDURE — 2022F DILAT RTA XM EVC RTNOPTHY: CPT | Performed by: NURSE PRACTITIONER

## 2025-04-15 PROCEDURE — G8427 DOCREV CUR MEDS BY ELIG CLIN: HCPCS | Performed by: NURSE PRACTITIONER

## 2025-04-15 PROCEDURE — G2211 COMPLEX E/M VISIT ADD ON: HCPCS | Performed by: NURSE PRACTITIONER

## 2025-04-15 PROCEDURE — 1159F MED LIST DOCD IN RCRD: CPT | Performed by: NURSE PRACTITIONER

## 2025-04-15 PROCEDURE — 3017F COLORECTAL CA SCREEN DOC REV: CPT | Performed by: NURSE PRACTITIONER

## 2025-04-15 PROCEDURE — 1123F ACP DISCUSS/DSCN MKR DOCD: CPT | Performed by: NURSE PRACTITIONER

## 2025-04-15 PROCEDURE — 3046F HEMOGLOBIN A1C LEVEL >9.0%: CPT | Performed by: NURSE PRACTITIONER

## 2025-04-15 RX ORDER — DEXTROAMPHETAMINE SACCHARATE, AMPHETAMINE ASPARTATE, DEXTROAMPHETAMINE SULFATE AND AMPHETAMINE SULFATE 7.5; 7.5; 7.5; 7.5 MG/1; MG/1; MG/1; MG/1
30 TABLET ORAL 2 TIMES DAILY
Qty: 60 TABLET | Refills: 0 | Status: SHIPPED | OUTPATIENT
Start: 2025-05-14 | End: 2025-06-13

## 2025-04-15 RX ORDER — GABAPENTIN 300 MG/1
300 CAPSULE ORAL 3 TIMES DAILY
Qty: 270 CAPSULE | Refills: 1 | Status: SHIPPED | OUTPATIENT
Start: 2025-04-15 | End: 2025-10-12

## 2025-04-15 RX ORDER — DEXTROAMPHETAMINE SACCHARATE, AMPHETAMINE ASPARTATE, DEXTROAMPHETAMINE SULFATE AND AMPHETAMINE SULFATE 7.5; 7.5; 7.5; 7.5 MG/1; MG/1; MG/1; MG/1
30 TABLET ORAL 2 TIMES DAILY
Qty: 60 TABLET | Refills: 0 | Status: SHIPPED | OUTPATIENT
Start: 2025-04-15 | End: 2025-05-15

## 2025-04-15 RX ORDER — DEXTROAMPHETAMINE SACCHARATE, AMPHETAMINE ASPARTATE, DEXTROAMPHETAMINE SULFATE AND AMPHETAMINE SULFATE 7.5; 7.5; 7.5; 7.5 MG/1; MG/1; MG/1; MG/1
30 TABLET ORAL 2 TIMES DAILY
Qty: 60 TABLET | Refills: 0 | Status: SHIPPED | OUTPATIENT
Start: 2025-06-12 | End: 2025-07-12

## 2025-04-15 ASSESSMENT — ENCOUNTER SYMPTOMS
NAUSEA: 0
COUGH: 0
ABDOMINAL PAIN: 0
VOMITING: 0
SORE THROAT: 0

## 2025-04-15 NOTE — ASSESSMENT & PLAN NOTE
REQUIRES IN PERSON AT NEXT VISIT    On Adderall 30 mg p.o. twice daily  Does not see Psych     CS Use     Chronic.  Stable..  CSA: UTD. 02/29/2024  UDS: UTD. 02/29/2024  Medication: See above - 90 day refill given  Discussed warning S&S r/t medication usage. Discussed SE, AR, AE.  F/u in 3 months for re-evaluation, unless an earlier f/u is required. If improvement is noted, we will provide additional refills.  Referral:  Previously performed. Will cancel referral .     ADD/ADHD  Plan:  Psychosocial Interventions:  Psychoeducation: Educate the patient and family about ADHD, its symptoms, and management strategies.  Behavioral Therapy: Implement behavior modification techniques to address specific symptoms, improve organization, and enhance coping skills.  Parent Training: Provide parents with strategies to manage the patient's behavior effectively at home.  Academic/Work Accommodations: Collaborate with schools or employers to implement accommodations such as extended time for tasks, preferential seating, or breaks as needed.  Pharmacological Interventions:  Medication Trial: Consider initiating pharmacotherapy based on the severity of symptoms and functional impairment. Options include stimulant medications (e.g., methylphenidate, amphetamine derivatives) or non-stimulant medications (e.g., atomoxetine, guanfacine).  Start with low doses and titrate gradually while monitoring for efficacy and side effects.  Educate the patient and family about medication effects, adherence, and potential risks.  Regular Follow-up:  Schedule regular follow-up appointments to monitor treatment response, adjust interventions as necessary, and address any concerns or challenges.  Collaborate with other healthcare providers, educators, and caregivers involved in the patient's care to ensure a comprehensive approach.  Supportive Services:  Referral to Mental Health Professional: Offer ongoing counseling or therapy to address comorbid

## 2025-04-15 NOTE — PROGRESS NOTES
quality and attention span.  Safety Planning:  Address any safety concerns related to impulsivity or risk-taking behaviors by implementing safety plans at home, school, or work.  Long-Term Management:  Emphasize the importance of ongoing management of ADHD symptoms throughout the patient's lifespan, including adolescence and adulthood.  Discuss strategies for transitioning to adult healthcare services and managing ADHD in different life stages.     This assessment and plan will be implemented with regular reassessment and adjustments as needed to optimize the patient's functioning and quality of life while managing ADHD symptoms effectively.  Orders:  -     amphetamine-dextroamphetamine (ADDERALL, 30MG,) 30 MG tablet; Take 1 tablet by mouth 2 times daily for 30 days. Max Daily Amount: 60 mg, Disp-60 tablet, R-0Normal  -     amphetamine-dextroamphetamine (ADDERALL, 30MG,) 30 MG tablet; Take 1 tablet by mouth 2 times daily for 30 days. Max Daily Amount: 60 mg, Disp-60 tablet, R-0Normal  -     amphetamine-dextroamphetamine (ADDERALL, 30MG,) 30 MG tablet; Take 1 tablet by mouth 2 times daily for 30 days. Max Daily Amount: 60 mg, Disp-60 tablet, R-0Normal  3. Type 2 diabetes mellitus with diabetic neuropathy, without long-term current use of insulin ([facility])  Assessment & Plan:  On Losartan 50 mg PO QD  On atorvastatin 10 mg p.o. daily  Does not see cardiology    On metformin 2500 mg p.o. daily  On glimepiride 4 mg p.o. daily  On Trulicity 0.75 mg q. weekly  On gabapentin 300 mg 3 times daily    ----------------------------------  01/08/2025:    On metformin 2500 mg PO QD  On glimepiride 4 mg daily  Requesting to switch from Ozempic to Trulicity.  He was previously on Trulicity he would like to do 0.75.  Does not see specialist     On atorvastatin 10 mg daily  On losartan 50 mg daily  On gabapentin 300 mg p.o. 3 times daily      DM Type II     Chronic. Stable/Well-Controlled  Labs: A1C (as indicated) with annual labs.

## 2025-04-15 NOTE — ASSESSMENT & PLAN NOTE
On Losartan 50 mg PO QD  On atorvastatin 10 mg p.o. daily  Does not see cardiology     On metformin 2500 mg p.o. daily  On glimepiride 4 mg p.o. daily  On Trulicity 0.75 mg q. weekly  On gabapentin 300 mg 3 times daily    Hyperlipidemia       Chronic. Stable/Well-Controlled.  Labs: at least annually. .  Non-laboratory testing: None indicated today.  Medication:  See above     Discussed warning S&S r/t medication usage. Discussed SE, AR, AE.  F/u in 6 months for re-evaluation, unless an earlier f/u is required. If improvement is noted, we will provide additional refills.     Diet/Exercise/Lifestyle Changes  Encourage appropriate rest and fluid intake  Encourage lifestyle changes, including healthy eating, exercise, limiting alcohol/tobacco use, and stress reducers.  Dietary Considerations  Eat more grains (substitute quinoa or brown rice instead of potatoes or pasta)  Eat more nuts (unsalted)  Eat more omega 3 fats (2 servings per week)  [place], mackerel, herring, tuna, flax, walnuts, spinach, broccoli, edamame, omega supplements  Eat more applies, pears, oranges, and mir  Use olive oil when possible  Olive oil is not good for high heat applications like frying  Try using canola oil with high heat applications  Can be mixed with red wine vinegar, minced garlic, and ground pepper to make salad dressing  Drizzle 3 tbsp olive oil over vegetables (carrots, leeks). Scatter on some herbs, cover with foil. Bake at 375 for 45 minutes.  Try different foods  Avocado    Tofu  Activity Changes:  Exercise - try to get at least 15 minutes of dedicated activity every day  Eat smaller portions  Things to limit or take out of your diet:  Avoid soda, baked goods, candies, cookies as much as possible (these have high sugar content)  Avoid french fries, crackers, cakes, chips as much as possible (these have high trans fat content)  Limit eggs (4 per week)  Limit saturated fats (meat, dairy, eggs)

## 2025-04-15 NOTE — ASSESSMENT & PLAN NOTE
On Losartan 50 mg PO QD  On atorvastatin 10 mg p.o. daily  Does not see cardiology     HTN     Chronic. Stable/Well-Controlled  Labs: at least annually. Recent labs reviewed.  Non-laboratory testing: ECG (as indicated, not performed today)  Medication:  Given refill for losartan  Medication and dosage is unchanged today.  Discussed warning S&S r/t medication usage. Discussed SE, AR, AE.  Tolerating medication well. No SE, AR, AE. Benefits outweigh risks. Prefers to continue medication as currently prescribed.  Encourage appropriate rest and fluid intake  Encourage lifestyle changes, including healthy eating, exercise, limiting alcohol/tobacco use, and stress reducers. DASH diet. Reduce sodium intake. Limit alcohol consumption to < 1 oz/day.  Monitor BP and HR at home. Keep a log and bring to each visit.  F/u in 6 months for re-evaluation, unless an earlier f/u is required. If improvement is noted, we will provide additional refills.

## 2025-04-15 NOTE — ASSESSMENT & PLAN NOTE
On Losartan 50 mg PO QD  On atorvastatin 10 mg p.o. daily  Does not see cardiology    On metformin 2500 mg p.o. daily  On glimepiride 4 mg p.o. daily  On Trulicity 0.75 mg q. weekly  On gabapentin 300 mg 3 times daily    ----------------------------------  01/08/2025:    On metformin 2500 mg PO QD  On glimepiride 4 mg daily  Requesting to switch from Ozempic to Trulicity.  He was previously on Trulicity he would like to do 0.75.  Does not see specialist     On atorvastatin 10 mg daily  On losartan 50 mg daily  On gabapentin 300 mg p.o. 3 times daily      DM Type II     Chronic. Stable/Well-Controlled  Labs: A1C (as indicated) with annual labs. Recent labs reviewed.  Medication:  See above  Discussed warning S&S r/t medication usage. Discussed SE, AR, AE.  Tolerating medication well. No SE, AR, AE. Benefits outweigh risks. Prefers to continue medication as currently prescribed  Encourage appropriate rest and fluid intake  Encourage lifestyle changes, including healthy eating, exercise, limiting alcohol/tobacco use, and stress reducers.  Monitor BP and HR at home. Keep a log and bring to each visit.  Monitor BGL readings at home as directed (4x daily). Keep a log and bring to each visit.  F/u in 6 months for re-evaluation, unless an earlier f/u is required     General Measures:  Recommended diabetic foot exam at every visit.  Recommended annual diabetic eye exam  Monitor for control of cardiovascular risk factors including BP and lipids  Provided diabetes self-management education     Monofilament:Performed today. Sensory exam of the foot is STABLE, tested with the monofilament. Good pulses, no lesions or ulcers, good peripheral pulses.     No components found for: \"HGBA1C\"             Lab Results   Component Value Date     LDLCALC 29.8 02/22/2024     CREATININE 1.00 02/22/2024         Diabetes (Patient Education)        Weight loss through dietary modification can improve many aspects of type 2 diabetes including

## 2025-04-21 DIAGNOSIS — Z13.220 SCREENING FOR LIPID DISORDERS: ICD-10-CM

## 2025-04-21 DIAGNOSIS — Z13.228 SCREENING FOR ENDOCRINE, NUTRITIONAL, METABOLIC AND IMMUNITY DISORDER: ICD-10-CM

## 2025-04-21 DIAGNOSIS — R53.82 CHRONIC FATIGUE: ICD-10-CM

## 2025-04-21 DIAGNOSIS — R53.83 OTHER FATIGUE: ICD-10-CM

## 2025-04-21 DIAGNOSIS — Z13.228 SCREENING FOR METABOLIC DISORDER: ICD-10-CM

## 2025-04-21 DIAGNOSIS — F90.0 ATTENTION DEFICIT HYPERACTIVITY DISORDER (ADHD), PREDOMINANTLY INATTENTIVE TYPE: ICD-10-CM

## 2025-04-21 DIAGNOSIS — R79.0 LOW MAGNESIUM LEVEL: ICD-10-CM

## 2025-04-21 DIAGNOSIS — E78.2 MIXED HYPERLIPIDEMIA: ICD-10-CM

## 2025-04-21 DIAGNOSIS — R73.09 OTHER ABNORMAL GLUCOSE: ICD-10-CM

## 2025-04-21 DIAGNOSIS — R53.81 MALAISE AND FATIGUE: ICD-10-CM

## 2025-04-21 DIAGNOSIS — Z12.5 SCREENING FOR PROSTATE CANCER: ICD-10-CM

## 2025-04-21 DIAGNOSIS — N47.1 PHIMOSIS: ICD-10-CM

## 2025-04-21 DIAGNOSIS — Z13.29 SCREENING FOR ENDOCRINE, METABOLIC AND IMMUNITY DISORDER: ICD-10-CM

## 2025-04-21 DIAGNOSIS — R53.81 MALAISE: ICD-10-CM

## 2025-04-21 DIAGNOSIS — R79.89 OTHER SPECIFIED ABNORMAL FINDINGS OF BLOOD CHEMISTRY: ICD-10-CM

## 2025-04-21 DIAGNOSIS — Z13.0 SCREENING FOR ENDOCRINE, NUTRITIONAL, METABOLIC AND IMMUNITY DISORDER: ICD-10-CM

## 2025-04-21 DIAGNOSIS — Z13.1 SCREENING FOR DIABETES MELLITUS: ICD-10-CM

## 2025-04-21 DIAGNOSIS — N40.0 BPH WITHOUT URINARY OBSTRUCTION: ICD-10-CM

## 2025-04-21 DIAGNOSIS — Z13.21 ENCOUNTER FOR VITAMIN DEFICIENCY SCREENING: ICD-10-CM

## 2025-04-21 DIAGNOSIS — I10 PRIMARY HYPERTENSION: ICD-10-CM

## 2025-04-21 DIAGNOSIS — E11.40 TYPE 2 DIABETES MELLITUS WITH DIABETIC NEUROPATHY, WITHOUT LONG-TERM CURRENT USE OF INSULIN (HCC): ICD-10-CM

## 2025-04-21 DIAGNOSIS — Z13.0 SCREENING FOR IRON DEFICIENCY ANEMIA: ICD-10-CM

## 2025-04-21 DIAGNOSIS — Z13.21 SCREENING FOR ENDOCRINE, NUTRITIONAL, METABOLIC AND IMMUNITY DISORDER: ICD-10-CM

## 2025-04-21 DIAGNOSIS — Z13.0 SCREENING FOR ENDOCRINE, METABOLIC AND IMMUNITY DISORDER: ICD-10-CM

## 2025-04-21 DIAGNOSIS — Z13.29 SCREENING FOR THYROID DISORDER: ICD-10-CM

## 2025-04-21 DIAGNOSIS — Z13.29 SCREENING FOR ENDOCRINE, NUTRITIONAL, METABOLIC AND IMMUNITY DISORDER: ICD-10-CM

## 2025-04-21 DIAGNOSIS — E55.9 VITAMIN D DEFICIENCY: ICD-10-CM

## 2025-04-21 DIAGNOSIS — R79.9 ABNORMAL BLOOD CHEMISTRY: ICD-10-CM

## 2025-04-21 DIAGNOSIS — N52.9 ERECTILE DYSFUNCTION, UNSPECIFIED ERECTILE DYSFUNCTION TYPE: ICD-10-CM

## 2025-04-21 DIAGNOSIS — N47.5 PENILE ADHESIONS: ICD-10-CM

## 2025-04-21 DIAGNOSIS — Z13.0 SCREENING FOR DEFICIENCY ANEMIA: ICD-10-CM

## 2025-04-21 DIAGNOSIS — R53.83 MALAISE AND FATIGUE: ICD-10-CM

## 2025-04-21 DIAGNOSIS — Z13.228 SCREENING FOR ENDOCRINE, METABOLIC AND IMMUNITY DISORDER: ICD-10-CM

## 2025-04-21 DIAGNOSIS — N48.1 BALANITIS: ICD-10-CM

## 2025-04-21 DIAGNOSIS — Z79.899 ENCOUNTER FOR LONG-TERM (CURRENT) USE OF MEDICATIONS: ICD-10-CM

## 2025-04-21 LAB
25(OH)D3 SERPL-MCNC: 27.2 NG/ML (ref 30–100)
ALBUMIN SERPL-MCNC: 4.2 G/DL (ref 3.2–4.6)
ALBUMIN/GLOB SERPL: 1.8 (ref 1–1.9)
ALP SERPL-CCNC: 75 U/L (ref 40–129)
ALT SERPL-CCNC: 23 U/L (ref 8–55)
ANION GAP SERPL CALC-SCNC: 11 MMOL/L (ref 7–16)
AST SERPL-CCNC: 21 U/L (ref 15–37)
BASOPHILS # BLD: 0.04 K/UL (ref 0–0.2)
BASOPHILS NFR BLD: 0.5 % (ref 0–2)
BILIRUB SERPL-MCNC: 0.6 MG/DL (ref 0–1.2)
BUN SERPL-MCNC: 20 MG/DL (ref 8–23)
CALCIUM SERPL-MCNC: 9.6 MG/DL (ref 8.8–10.2)
CHLORIDE SERPL-SCNC: 101 MMOL/L (ref 98–107)
CHOLEST SERPL-MCNC: 83 MG/DL (ref 0–200)
CO2 SERPL-SCNC: 23 MMOL/L (ref 20–29)
CREAT SERPL-MCNC: 0.87 MG/DL (ref 0.8–1.3)
CREAT UR-MCNC: 131 MG/DL (ref 39–259)
DIFFERENTIAL METHOD BLD: ABNORMAL
EOSINOPHIL # BLD: 0.03 K/UL (ref 0–0.8)
EOSINOPHIL NFR BLD: 0.3 % (ref 0.5–7.8)
ERYTHROCYTE [DISTWIDTH] IN BLOOD BY AUTOMATED COUNT: 13.4 % (ref 11.9–14.6)
EST. AVERAGE GLUCOSE BLD GHB EST-MCNC: 228 MG/DL
FERRITIN SERPL-MCNC: 101 NG/ML (ref 8–388)
FOLATE SERPL-MCNC: 39.7 NG/ML (ref 3.1–17.5)
GLOBULIN SER CALC-MCNC: 2.4 G/DL (ref 2.3–3.5)
GLUCOSE SERPL-MCNC: 61 MG/DL (ref 70–99)
HBA1C MFR BLD: 9.6 % (ref 0–5.6)
HCT VFR BLD AUTO: 42.7 % (ref 41.1–50.3)
HDLC SERPL-MCNC: 45 MG/DL (ref 40–60)
HDLC SERPL: 1.8 (ref 0–5)
HGB BLD-MCNC: 14.4 G/DL (ref 13.6–17.2)
IMM GRANULOCYTES # BLD AUTO: 0.03 K/UL (ref 0–0.5)
IMM GRANULOCYTES NFR BLD AUTO: 0.3 % (ref 0–5)
IRON SATN MFR SERPL: 19 % (ref 20–50)
IRON SERPL-MCNC: 68 UG/DL (ref 35–100)
LDLC SERPL CALC-MCNC: 21 MG/DL (ref 0–100)
LYMPHOCYTES # BLD: 1.79 K/UL (ref 0.5–4.6)
LYMPHOCYTES NFR BLD: 20.8 % (ref 13–44)
MAGNESIUM SERPL-MCNC: 1.8 MG/DL (ref 1.8–2.4)
MCH RBC QN AUTO: 28 PG (ref 26.1–32.9)
MCHC RBC AUTO-ENTMCNC: 33.7 G/DL (ref 31.4–35)
MCV RBC AUTO: 82.9 FL (ref 82–102)
MICROALBUMIN UR-MCNC: 1.59 MG/DL (ref 0–20)
MICROALBUMIN/CREAT UR-RTO: 12 MG/G (ref 0–30)
MONOCYTES # BLD: 0.57 K/UL (ref 0.1–1.3)
MONOCYTES NFR BLD: 6.6 % (ref 4–12)
NEUTS SEG # BLD: 6.13 K/UL (ref 1.7–8.2)
NEUTS SEG NFR BLD: 71.5 % (ref 43–78)
NRBC # BLD: 0 K/UL (ref 0–0.2)
PLATELET # BLD AUTO: 192 K/UL (ref 150–450)
PMV BLD AUTO: 11.5 FL (ref 9.4–12.3)
POTASSIUM SERPL-SCNC: 4.8 MMOL/L (ref 3.5–5.1)
PROT SERPL-MCNC: 6.6 G/DL (ref 6.3–8.2)
PSA SERPL-MCNC: 9.4 NG/ML (ref 0–4)
RBC # BLD AUTO: 5.15 M/UL (ref 4.23–5.6)
SODIUM SERPL-SCNC: 135 MMOL/L (ref 136–145)
TIBC SERPL-MCNC: 360 UG/DL (ref 240–450)
TRIGL SERPL-MCNC: 82 MG/DL (ref 0–150)
TSH W FREE THYROID IF ABNORMAL: 1.74 UIU/ML (ref 0.27–4.2)
UIBC SERPL-MCNC: 292 UG/DL (ref 112–347)
VIT B12 SERPL-MCNC: 285 PG/ML (ref 193–986)
VLDLC SERPL CALC-MCNC: 16 MG/DL (ref 6–23)
WBC # BLD AUTO: 8.6 K/UL (ref 4.3–11.1)

## 2025-04-22 ENCOUNTER — RESULTS FOLLOW-UP (OUTPATIENT)
Dept: PRIMARY CARE CLINIC | Facility: CLINIC | Age: 70
End: 2025-04-22

## 2025-04-22 DIAGNOSIS — E11.40 TYPE 2 DIABETES MELLITUS WITH DIABETIC NEUROPATHY, WITHOUT LONG-TERM CURRENT USE OF INSULIN (HCC): Primary | ICD-10-CM

## 2025-04-22 NOTE — RESULT ENCOUNTER NOTE
Please let the patient know:        Complete Blood Count (CBC)  Your white and red blood cell counts, hemoglobin, hematocrit, and platelets are all within normal limits. There are no signs of infection or anemia.    Electrolytes and Kidney Function  Your sodium level is slightly low at 135 (normal is 136 to 145), but kidney function is excellent with a creatinine of 0.87 and estimated GFR over 90. Potassium and other electrolytes are within normal range.    Diabetes and Blood Sugar Control  Your hemoglobin A1C is elevated at 9.6 percent, indicating poor long-term blood sugar control. Your estimated average glucose is 228. Your fasting glucose is low at 61, which raises concern for possible hypoglycemia from glimepiride.  Recommendations:  · Continue metformin.  · Consider reducing or stopping glimepiride to prevent low glucose levels.  · Increase Trulicity to 1.5 mg weekly to improve blood sugar control, as tolerated.  · Monitor blood sugar at home consistently.  · Referral to a diabetes educator or endocrinologist is advised.    Cholesterol Panel  Your cholesterol levels are well-controlled: total cholesterol is 83, LDL is 21, HDL is 45, and triglycerides are 82. Continue atorvastatin as prescribed.    Vitamin and Mineral Levels  · Vitamin D is low at 27.2 (goal is greater than 30).  · Vitamin B12 is low-normal at 285.  · Iron saturation is borderline low at 19 percent.  · Magnesium is at the lower end of normal at 1.8.  · Folate is elevated but not concerning.  Recommendations:  · Increase vitamin D to 7820-2028 IU daily or continue current weekly prescription dose.  · Start vitamin B12 1000 mcg daily by mouth.  · Consider a multivitamin with iron if feeling fatigued or if dietary intake is low.    Prostate Cancer Screening (PSA)  Your PSA is elevated at 9.4 (normal is 0 to 4). Continued follow-up with urology is appropriate and already in place.    Liver Function and Proteins  All liver enzymes and protein levels  negative Alert & oriented; no sensory, motor or coordination deficits, normal reflexes

## 2025-06-30 ENCOUNTER — OFFICE VISIT (OUTPATIENT)
Dept: PRIMARY CARE CLINIC | Facility: CLINIC | Age: 70
End: 2025-06-30

## 2025-06-30 VITALS
OXYGEN SATURATION: 96 % | HEIGHT: 69 IN | TEMPERATURE: 98.4 F | DIASTOLIC BLOOD PRESSURE: 82 MMHG | WEIGHT: 180 LBS | SYSTOLIC BLOOD PRESSURE: 136 MMHG | HEART RATE: 75 BPM | BODY MASS INDEX: 26.66 KG/M2

## 2025-06-30 DIAGNOSIS — Z13.9 SCREENING FOR UNSPECIFIED CONDITION: ICD-10-CM

## 2025-06-30 DIAGNOSIS — Z79.899 ENCOUNTER FOR LONG-TERM (CURRENT) USE OF HIGH-RISK MEDICATION: ICD-10-CM

## 2025-06-30 DIAGNOSIS — N40.0 BPH WITHOUT URINARY OBSTRUCTION: ICD-10-CM

## 2025-06-30 DIAGNOSIS — E11.40 TYPE 2 DIABETES MELLITUS WITH DIABETIC NEUROPATHY, WITHOUT LONG-TERM CURRENT USE OF INSULIN (HCC): ICD-10-CM

## 2025-06-30 DIAGNOSIS — N48.1 BALANITIS: ICD-10-CM

## 2025-06-30 DIAGNOSIS — F90.0 ATTENTION DEFICIT HYPERACTIVITY DISORDER (ADHD), PREDOMINANTLY INATTENTIVE TYPE: ICD-10-CM

## 2025-06-30 DIAGNOSIS — N52.9 ERECTILE DYSFUNCTION, UNSPECIFIED ERECTILE DYSFUNCTION TYPE: ICD-10-CM

## 2025-06-30 DIAGNOSIS — Z51.81 ENCOUNTER FOR THERAPEUTIC DRUG MONITORING: ICD-10-CM

## 2025-06-30 DIAGNOSIS — E78.2 MIXED HYPERLIPIDEMIA: ICD-10-CM

## 2025-06-30 DIAGNOSIS — Z02.83 ENCOUNTER FOR DRUG SCREENING: ICD-10-CM

## 2025-06-30 DIAGNOSIS — N47.1 PHIMOSIS: ICD-10-CM

## 2025-06-30 DIAGNOSIS — E55.9 VITAMIN D DEFICIENCY: ICD-10-CM

## 2025-06-30 DIAGNOSIS — Z71.89 ACP (ADVANCE CARE PLANNING): ICD-10-CM

## 2025-06-30 DIAGNOSIS — I10 PRIMARY HYPERTENSION: ICD-10-CM

## 2025-06-30 DIAGNOSIS — Z00.00 MEDICARE ANNUAL WELLNESS VISIT, SUBSEQUENT: Primary | ICD-10-CM

## 2025-06-30 DIAGNOSIS — N47.5 PENILE ADHESIONS: ICD-10-CM

## 2025-06-30 RX ORDER — DEXTROAMPHETAMINE SACCHARATE, AMPHETAMINE ASPARTATE, DEXTROAMPHETAMINE SULFATE AND AMPHETAMINE SULFATE 7.5; 7.5; 7.5; 7.5 MG/1; MG/1; MG/1; MG/1
30 TABLET ORAL 2 TIMES DAILY
Qty: 60 TABLET | Refills: 0 | Status: SHIPPED | OUTPATIENT
Start: 2025-07-29 | End: 2025-08-28

## 2025-06-30 RX ORDER — GLIMEPIRIDE 4 MG/1
4 TABLET ORAL
Qty: 90 TABLET | Refills: 1 | Status: SHIPPED | OUTPATIENT
Start: 2025-06-30

## 2025-06-30 RX ORDER — ERGOCALCIFEROL 1.25 MG/1
50000 CAPSULE, LIQUID FILLED ORAL WEEKLY
Qty: 12 CAPSULE | Refills: 1 | Status: SHIPPED | OUTPATIENT
Start: 2025-06-30

## 2025-06-30 RX ORDER — LOSARTAN POTASSIUM 50 MG/1
50 TABLET ORAL DAILY
Qty: 90 TABLET | Refills: 1 | Status: SHIPPED | OUTPATIENT
Start: 2025-06-30

## 2025-06-30 RX ORDER — ATORVASTATIN CALCIUM 10 MG/1
10 TABLET, FILM COATED ORAL DAILY
Qty: 90 TABLET | Refills: 1 | Status: SHIPPED | OUTPATIENT
Start: 2025-06-30

## 2025-06-30 RX ORDER — DEXTROAMPHETAMINE SACCHARATE, AMPHETAMINE ASPARTATE, DEXTROAMPHETAMINE SULFATE AND AMPHETAMINE SULFATE 7.5; 7.5; 7.5; 7.5 MG/1; MG/1; MG/1; MG/1
30 TABLET ORAL 2 TIMES DAILY
Qty: 60 TABLET | Refills: 0 | Status: SHIPPED | OUTPATIENT
Start: 2025-08-27 | End: 2025-09-26

## 2025-06-30 RX ORDER — DEXTROAMPHETAMINE SACCHARATE, AMPHETAMINE ASPARTATE, DEXTROAMPHETAMINE SULFATE AND AMPHETAMINE SULFATE 7.5; 7.5; 7.5; 7.5 MG/1; MG/1; MG/1; MG/1
30 TABLET ORAL 2 TIMES DAILY
Qty: 60 TABLET | Refills: 0 | Status: SHIPPED | OUTPATIENT
Start: 2025-06-30 | End: 2025-07-30

## 2025-06-30 ASSESSMENT — ENCOUNTER SYMPTOMS
NAUSEA: 0
VOMITING: 0
COUGH: 0
ABDOMINAL PAIN: 0
SORE THROAT: 0

## 2025-06-30 ASSESSMENT — PATIENT HEALTH QUESTIONNAIRE - PHQ9
SUM OF ALL RESPONSES TO PHQ QUESTIONS 1-9: 0
SUM OF ALL RESPONSES TO PHQ QUESTIONS 1-9: 0
3. TROUBLE FALLING OR STAYING ASLEEP: NOT AT ALL
5. POOR APPETITE OR OVEREATING: NOT AT ALL
10. IF YOU CHECKED OFF ANY PROBLEMS, HOW DIFFICULT HAVE THESE PROBLEMS MADE IT FOR YOU TO DO YOUR WORK, TAKE CARE OF THINGS AT HOME, OR GET ALONG WITH OTHER PEOPLE: NOT DIFFICULT AT ALL
4. FEELING TIRED OR HAVING LITTLE ENERGY: NOT AT ALL
9. THOUGHTS THAT YOU WOULD BE BETTER OFF DEAD, OR OF HURTING YOURSELF: NOT AT ALL
1. LITTLE INTEREST OR PLEASURE IN DOING THINGS: NOT AT ALL
2. FEELING DOWN, DEPRESSED OR HOPELESS: NOT AT ALL
8. MOVING OR SPEAKING SO SLOWLY THAT OTHER PEOPLE COULD HAVE NOTICED. OR THE OPPOSITE, BEING SO FIGETY OR RESTLESS THAT YOU HAVE BEEN MOVING AROUND A LOT MORE THAN USUAL: NOT AT ALL
SUM OF ALL RESPONSES TO PHQ QUESTIONS 1-9: 0
6. FEELING BAD ABOUT YOURSELF - OR THAT YOU ARE A FAILURE OR HAVE LET YOURSELF OR YOUR FAMILY DOWN: NOT AT ALL
SUM OF ALL RESPONSES TO PHQ QUESTIONS 1-9: 0
7. TROUBLE CONCENTRATING ON THINGS, SUCH AS READING THE NEWSPAPER OR WATCHING TELEVISION: NOT AT ALL

## 2025-06-30 NOTE — PROGRESS NOTES
obstruction  Assessment & Plan:  On alfuzosin 10 mg PO QD  Stable. Chronic  Does see Urology  Continue current tx plan         Results         Return for Labs in 4 weeks (> 07/21) then 3 months (VV is okay).     Subjective   The following acute and/or chronic problems were also addressed today:  History of Present Illness        Patient's complete Health Risk Assessment and screening values have been reviewed and are found in Flowsheets. The following problems were reviewed today and where indicated follow up appointments were made and/or referrals ordered.    Positive Risk Factor Screenings with Interventions:              Inactivity:  On average, how many days per week do you engage in moderate to strenuous exercise (like a brisk walk)?: 0 days (!) Abnormal  On average, how many minutes do you engage in exercise at this level?: 0 min  Interventions:  See A/P for plan and any pertinent orders    Poor Eating Habits/Diet:  Do you eat balanced/healthy meals regularly?: (!) No  Interventions:  See A/P for plan and any pertinent orders     Dentist Screen:  Have you seen the dentist within the past year?: (!) No    Intervention:  See A/P for any pertinent orders      Safety:  Do you have any tripping hazards - loose or unsecured carpets or rugs?: (!) Yes  Do you have non-slip mats or non-slip surfaces or shower bars or grab bars in your shower or bathtub?: (!) No  Interventions:  See A/P for plan and any pertinent orders     Advanced Directives:  Do you have a Living Will?: (!) No    Intervention:  has NO advanced directive - information provided    Advance Care Planning   Discussed the patient’s choices for care and treatment preferences in case of a health event that adversely affects decision-making abilities or is life-limiting. Recommended the patient document care preferences in state-specific advance directives. Also reviewed the process of designating a trusted capable adult as an Agent (or Health Care Power of

## 2025-06-30 NOTE — ASSESSMENT & PLAN NOTE
On Losartan 50 mg PO QD  On atorvastatin 10 mg p.o. daily    On glimepiride 4 mg PO QD  On Metformin 1000 and 500 mg PO QD (#2500 daily)  On trulicity 1.5 mg q weekly  On gabapentin 300 mg 3 times daily    No longer on ozempic    Does not see cardiology   Does not see endocrinology     ----------------------------  Hyperlipidemia        Chronic. Stable/Well-Controlled.  Labs: at least annually. .  Non-laboratory testing: None indicated today.  Medication:  See above     Discussed warning S&S r/t medication usage. Discussed SE, AR, AE.  F/u in 6 months for re-evaluation, unless an earlier f/u is required. If improvement is noted, we will provide additional refills.     Diet/Exercise/Lifestyle Changes  Encourage appropriate rest and fluid intake  Encourage lifestyle changes, including healthy eating, exercise, limiting alcohol/tobacco use, and stress reducers.  Dietary Considerations  Eat more grains (substitute quinoa or brown rice instead of potatoes or pasta)  Eat more nuts (unsalted)  Eat more omega 3 fats (2 servings per week)  [place], mackerel, herring, tuna, flax, walnuts, spinach, broccoli, edamame, omega supplements  Eat more applies, pears, oranges, and mir  Use olive oil when possible  Olive oil is not good for high heat applications like frying  Try using canola oil with high heat applications  Can be mixed with red wine vinegar, minced garlic, and ground pepper to make salad dressing  Drizzle 3 tbsp olive oil over vegetables (carrots, leeks). Scatter on some herbs, cover with foil. Bake at 375 for 45 minutes.  Try different foods  Avocado     Tofu  Activity Changes:  Exercise - try to get at least 15 minutes of dedicated activity every day  Eat smaller portions  Things to limit or take out of your diet:  Avoid soda, baked goods, candies, cookies as much as possible (these have high sugar content)  Avoid french fries, crackers, cakes, chips as much as possible (these have high trans fat content)  Limit

## 2025-06-30 NOTE — ASSESSMENT & PLAN NOTE
On Adderall 30 mg p.o. twice daily  Does not see Psych     CS Use     Chronic.  Stable..  CSA: UTD. 02/29/2024. 06/30/2025  UDS: UTD. 02/29/2024. 06/30/2025  Medication: See above - 90 day refill given  Discussed warning S&S r/t medication usage. Discussed SE, AR, AE.  F/u in 3 months for re-evaluation, unless an earlier f/u is required. If improvement is noted, we will provide additional refills.  Referral:  Previously performed. Will cancel referral .     ADD/ADHD  Plan:  Psychosocial Interventions:  Psychoeducation: Educate the patient and family about ADHD, its symptoms, and management strategies.  Behavioral Therapy: Implement behavior modification techniques to address specific symptoms, improve organization, and enhance coping skills.  Parent Training: Provide parents with strategies to manage the patient's behavior effectively at home.  Academic/Work Accommodations: Collaborate with schools or employers to implement accommodations such as extended time for tasks, preferential seating, or breaks as needed.  Pharmacological Interventions:  Medication Trial: Consider initiating pharmacotherapy based on the severity of symptoms and functional impairment. Options include stimulant medications (e.g., methylphenidate, amphetamine derivatives) or non-stimulant medications (e.g., atomoxetine, guanfacine).  Start with low doses and titrate gradually while monitoring for efficacy and side effects.  Educate the patient and family about medication effects, adherence, and potential risks.  Regular Follow-up:  Schedule regular follow-up appointments to monitor treatment response, adjust interventions as necessary, and address any concerns or challenges.  Collaborate with other healthcare providers, educators, and caregivers involved in the patient's care to ensure a comprehensive approach.  Supportive Services:  Referral to Mental Health Professional: Offer ongoing counseling or therapy to address comorbid conditions

## 2025-06-30 NOTE — PATIENT INSTRUCTIONS
Learning About Being Active as an Older Adult  Why is being active important as you get older?     Being active is one of the best things you can do for your health. And it's never too late to start. Being active--or getting active, if you aren't already--has definite benefits. It can:  Give you more energy,  Keep your mind sharp.  Improve balance to reduce your risk of falls.  Help you manage chronic illness with fewer medicines.  No matter how old you are, how fit you are, or what health problems you have, there is a form of activity that will work for you. And the more physical activity you can do, the better your overall health will be.  What kinds of activity can help you stay healthy?  Being more active will make your daily activities easier. Physical activity includes planned exercise and things you do in daily life. There are four types of activity:  Aerobic.  Doing aerobic activity makes your heart and lungs strong.  Includes walking, dancing, and gardening.  Aim for at least 2½ hours spread throughout the week.  It improves your energy and can help you sleep better.  Muscle-strengthening.  This type of activity can help maintain muscle and strengthen bones.  Includes climbing stairs, using resistance bands, and lifting or carrying heavy loads.  Aim for at least twice a week.  It can help protect the knees and other joints.  Stretching.  Stretching gives you better range of motion in joints and muscles.  Includes upper arm stretches, calf stretches, and gentle yoga.  Aim for at least twice a week, preferably after your muscles are warmed up from other activities.  It can help you function better in daily life.  Balancing.  This helps you stay coordinated and have good posture.  Includes heel-to-toe walking, adalgisa chi, and certain types of yoga.  Aim for at least 3 days a week.  It can reduce your risk of falling.  Even if you have a hard time meeting the recommendations, it's better to be more active

## 2025-06-30 NOTE — ASSESSMENT & PLAN NOTE
On Losartan 50 mg PO QD  On atorvastatin 10 mg p.o. daily    On glimepiride 4 mg PO QD  On Metformin 1000 and 500 mg PO QD (#2500 daily)  On trulicity 1.5 mg q weekly  On gabapentin 300 mg 3 times daily    No longer on ozempic    Does not see cardiology   Does not see endocrinology     ----------------------------------   DM Type II     Chronic. Stable/Well-Controlled  Labs: A1C (as indicated) with annual labs. Recent labs reviewed.  Medication:  See above  Discussed warning S&S r/t medication usage. Discussed SE, AR, AE.  Tolerating medication well. No SE, AR, AE. Benefits outweigh risks. Prefers to continue medication as currently prescribed  Encourage appropriate rest and fluid intake  Encourage lifestyle changes, including healthy eating, exercise, limiting alcohol/tobacco use, and stress reducers.  Monitor BP and HR at home. Keep a log and bring to each visit.  Monitor BGL readings at home as directed (4x daily). Keep a log and bring to each visit.  F/u in 6 months for re-evaluation, unless an earlier f/u is required     General Measures:  Recommended diabetic foot exam at every visit.  Recommended annual diabetic eye exam  Monitor for control of cardiovascular risk factors including BP and lipids  Provided diabetes self-management education     Monofilament:Performed today. Sensory exam of the foot is STABLE, tested with the monofilament. Good pulses, no lesions or ulcers, good peripheral pulses.      Diabetes (Patient Education) - Weight loss through dietary modification can improve many aspects of type 2 diabetes including glycemic control and hypertension. The improvement in glycemic control is related to both the degree caloric restriction and weight reduction. 150 minutes of intense aerobic exercise per week is shown to assist in diabetes management. Management of diabetes is good for long term health. Diabetes if shown to set one up for cardiac disease, high blood pressure, high cholesterol, and erectile

## 2025-06-30 NOTE — ASSESSMENT & PLAN NOTE
On Losartan 50 mg PO QD  On atorvastatin 10 mg p.o. daily    On glimepiride 4 mg PO QD  On Metformin 1000 and 500 mg PO QD (#2500 daily)  On trulicity 1.5 mg q weekly    Does not see cardiology     HTN     Chronic. Stable/Well-Controlled  Labs: at least annually. Recent labs reviewed.  Non-laboratory testing: ECG (as indicated, not performed today)  Medication:  Given refill for losartan  Medication and dosage is unchanged today.  Discussed warning S&S r/t medication usage. Discussed SE, AR, AE.  Tolerating medication well. No SE, AR, AE. Benefits outweigh risks. Prefers to continue medication as currently prescribed.  Encourage appropriate rest and fluid intake  Encourage lifestyle changes, including healthy eating, exercise, limiting alcohol/tobacco use, and stress reducers. DASH diet. Reduce sodium intake. Limit alcohol consumption to < 1 oz/day.  Monitor BP and HR at home. Keep a log and bring to each visit.  F/u in 6 months for re-evaluation, unless an earlier f/u is required. If improvement is noted, we will provide additional refills.

## 2025-07-07 DIAGNOSIS — E11.40 TYPE 2 DIABETES MELLITUS WITH DIABETIC NEUROPATHY, WITHOUT LONG-TERM CURRENT USE OF INSULIN (HCC): ICD-10-CM

## 2025-08-21 ENCOUNTER — LAB (OUTPATIENT)
Dept: UROLOGY | Age: 70
End: 2025-08-21

## 2025-08-21 DIAGNOSIS — N40.1 BPH WITH OBSTRUCTION/LOWER URINARY TRACT SYMPTOMS: ICD-10-CM

## 2025-08-21 DIAGNOSIS — N13.8 BPH WITH OBSTRUCTION/LOWER URINARY TRACT SYMPTOMS: ICD-10-CM

## 2025-08-21 LAB — PSA SERPL-MCNC: 4.2 NG/ML (ref 0–4)

## 2025-08-28 ENCOUNTER — OFFICE VISIT (OUTPATIENT)
Dept: UROLOGY | Age: 70
End: 2025-08-28
Payer: MEDICARE

## 2025-08-28 DIAGNOSIS — N40.1 BPH WITH OBSTRUCTION/LOWER URINARY TRACT SYMPTOMS: Primary | ICD-10-CM

## 2025-08-28 DIAGNOSIS — R97.20 ELEVATED PSA: ICD-10-CM

## 2025-08-28 DIAGNOSIS — N13.8 BPH WITH OBSTRUCTION/LOWER URINARY TRACT SYMPTOMS: Primary | ICD-10-CM

## 2025-08-28 LAB
BILIRUBIN, URINE, POC: NEGATIVE
BLOOD URINE, POC: NEGATIVE
GLUCOSE URINE, POC: 100 MG/DL
KETONES, URINE, POC: NEGATIVE MG/DL
LEUKOCYTE ESTERASE, URINE, POC: NEGATIVE
NITRITE, URINE, POC: NEGATIVE
PH, URINE, POC: 5.5 (ref 4.6–8)
PROTEIN,URINE, POC: NEGATIVE MG/DL
SPECIFIC GRAVITY, URINE, POC: 1.01 (ref 1–1.03)
URINALYSIS CLARITY, POC: NORMAL
URINALYSIS COLOR, POC: NORMAL
UROBILINOGEN, POC: NORMAL MG/DL

## 2025-08-28 PROCEDURE — 1123F ACP DISCUSS/DSCN MKR DOCD: CPT | Performed by: UROLOGY

## 2025-08-28 PROCEDURE — 1036F TOBACCO NON-USER: CPT | Performed by: UROLOGY

## 2025-08-28 PROCEDURE — 81003 URINALYSIS AUTO W/O SCOPE: CPT | Performed by: UROLOGY

## 2025-08-28 PROCEDURE — 1159F MED LIST DOCD IN RCRD: CPT | Performed by: UROLOGY

## 2025-08-28 PROCEDURE — G8427 DOCREV CUR MEDS BY ELIG CLIN: HCPCS | Performed by: UROLOGY

## 2025-08-28 PROCEDURE — 3017F COLORECTAL CA SCREEN DOC REV: CPT | Performed by: UROLOGY

## 2025-08-28 PROCEDURE — 1160F RVW MEDS BY RX/DR IN RCRD: CPT | Performed by: UROLOGY

## 2025-08-28 PROCEDURE — 99213 OFFICE O/P EST LOW 20 MIN: CPT | Performed by: UROLOGY

## 2025-08-28 PROCEDURE — G8419 CALC BMI OUT NRM PARAM NOF/U: HCPCS | Performed by: UROLOGY

## (undated) DEVICE — SHEET, T, LAPAROTOMY, STERILE: Brand: MEDLINE

## (undated) DEVICE — RESTRAINT UNIVERSAL HEAD DISP --

## (undated) DEVICE — 3M™ COBAN™ SELF-ADHERENT WRAP, 1586S, STERILE, 6 IN X 5 YD (15 CM X 4,5 M), 12 ROLLS/CASE: Brand: 3M™ COBAN™

## (undated) DEVICE — MINOR SPLIT GENERAL: Brand: MEDLINE INDUSTRIES, INC.

## (undated) DEVICE — SUTURE CHROMIC GUT SZ 3-0 L27IN ABSRB BRN L26MM SH 1/2 CIR G122H

## (undated) DEVICE — BANDAGE COMPR W1INXL5YD FOAM COHESIVE QUIK STK SELF ADH SFT

## (undated) DEVICE — SOLUTION IRRIG 3000ML 0.9% SOD CHL FLX CONT 0797208] ICU MEDICAL INC]

## (undated) DEVICE — SLING ARM AD ULT

## (undated) DEVICE — 2.0MM XBRAID TT, 100% UHMWPE, VIOLET CO-BRAID: Brand: XBRAID

## (undated) DEVICE — DRAPE,SHOULDER,BEACH CHAIR,STERILE: Brand: MEDLINE

## (undated) DEVICE — KENDALL RADIOLUCENT FOAM MONITORING ELECTRODE RECTANGULAR SHAPE: Brand: KENDALL

## (undated) DEVICE — GARMENT,MEDLINE,DVT,INT,CALF,MED, GEN2: Brand: MEDLINE

## (undated) DEVICE — SYR 3ML LL TIP 1/10ML GRAD --

## (undated) DEVICE — SYR 50ML LR LCK 1ML GRAD NSAF --

## (undated) DEVICE — CANNULA NSL ORAL AD FOR CAPNOFLEX CO2 O2 AIRLFE

## (undated) DEVICE — NEEDLE HYPO 21GA L1.5IN INTRAMUSCULAR S STL LATCH BVL UP

## (undated) DEVICE — PRECISION THIN (9.0 X 0.38 X 31.0MM)

## (undated) DEVICE — (D)PREP SKN CHLRAPRP APPL 26ML -- CONVERT TO ITEM 371833

## (undated) DEVICE — SOFT SILICONE HYDROCELLULAR FOAM DRESSING WITH LOCK AWAY LAYER: Brand: ALLEVYN LIFE XL 21X21 CTN10

## (undated) DEVICE — [RESECTOR CUTTER, ARTHROSCOPIC SHAVER BLADE,  DO NOT RESTERILIZE,  DO NOT USE IF PACKAGE IS DAMAGED,  KEEP DRY,  KEEP AWAY FROM SUNLIGHT]: Brand: FORMULA

## (undated) DEVICE — CONNECTOR TBNG OD5-7MM O2 END DISP

## (undated) DEVICE — PREMIUM WET SKIN PREP TRAY: Brand: MEDLINE INDUSTRIES, INC.

## (undated) DEVICE — SUTURE MCRYL SZ 3-0 L27IN ABSRB UD L19MM PS-2 3/8 CIR PRIM Y427H

## (undated) DEVICE — SURGICAL PROCEDURE PACK BASIC ST FRANCIS

## (undated) DEVICE — INTENDED FOR TISSUE SEPARATION, AND OTHER PROCEDURES THAT REQUIRE A SHARP SURGICAL BLADE TO PUNCTURE OR CUT.: Brand: BARD-PARKER ® STAINLESS STEEL BLADES

## (undated) DEVICE — DRESSING PETRO W3XL18IN WHT GZ FOR N ADH WND CURAD

## (undated) DEVICE — NEEDLE HYPO 18GA L1.5IN PNK S STL HUB POLYPR SHLD REG BVL

## (undated) DEVICE — NDL PRT INJ NSAF BLNT 18GX1.5 --

## (undated) DEVICE — [AGGRESSIVE 6-FLUTE BARREL BUR, ARTHROSCOPIC SHAVER BLADE,  DO NOT RESTERILIZE,  DO NOT USE IF PACKAGE IS DAMAGED,  KEEP DRY,  KEEP AWAY FROM SUNLIGHT]: Brand: FORMULA

## (undated) DEVICE — 90-S ACCELERATOR, SUCTION PROBE, NON-BENDABLE, MAX CUT LEVEL 11: Brand: SERFAS ENERGY

## (undated) DEVICE — STOCKINETTE,IMPERVIOUS,12X48,STERILE: Brand: MEDLINE

## (undated) DEVICE — NDL SPNE QNCKE 18GX3.5IN LF --

## (undated) DEVICE — SET IRRIG DST FLX M CONN

## (undated) DEVICE — (D)STRIP SKN CLSR 0.5X4IN WHT --

## (undated) DEVICE — SYR 5ML 1/5 GRAD LL NSAF LF --

## (undated) DEVICE — MASTISOL ADHESIVE LIQ 2/3ML

## (undated) DEVICE — SET IRRIG W 96IN TBNG 4 LN FLX BG

## (undated) DEVICE — BANDAGE GZ W2XL75IN ST RAYON POLY CNFRM STRTCH LTWT